# Patient Record
Sex: MALE | Race: WHITE | NOT HISPANIC OR LATINO | Employment: FULL TIME | URBAN - METROPOLITAN AREA
[De-identification: names, ages, dates, MRNs, and addresses within clinical notes are randomized per-mention and may not be internally consistent; named-entity substitution may affect disease eponyms.]

---

## 2023-02-09 PROBLEM — J34.2 NASAL SEPTAL DEVIATION: Status: ACTIVE | Noted: 2023-02-09

## 2023-02-09 PROBLEM — E78.5 DYSLIPIDEMIA, GOAL LDL BELOW 100: Status: ACTIVE | Noted: 2023-02-09

## 2023-02-09 PROBLEM — E66.811 CLASS 1 OBESITY WITH BODY MASS INDEX (BMI) OF 33.0 TO 33.9 IN ADULT: Status: ACTIVE | Noted: 2023-02-09

## 2023-02-09 PROBLEM — K64.9 HEMORRHOIDS: Status: ACTIVE | Noted: 2023-02-09

## 2023-02-09 PROBLEM — E66.9 CLASS 1 OBESITY WITH BODY MASS INDEX (BMI) OF 33.0 TO 33.9 IN ADULT: Status: ACTIVE | Noted: 2023-02-09

## 2023-02-09 PROBLEM — K62.5 PAINLESS RECTAL BLEEDING: Status: ACTIVE | Noted: 2023-02-09

## 2023-02-09 PROBLEM — K21.9 GASTROESOPHAGEAL REFLUX DISEASE WITHOUT ESOPHAGITIS: Status: ACTIVE | Noted: 2023-02-09

## 2023-02-09 PROBLEM — E88.810 METABOLIC SYNDROME X: Status: ACTIVE | Noted: 2023-02-09

## 2023-02-09 PROBLEM — E66.811 CLASS 1 OBESITY DUE TO EXCESS CALORIES WITH SERIOUS COMORBIDITY AND BODY MASS INDEX (BMI) OF 32.0 TO 32.9 IN ADULT: Status: ACTIVE | Noted: 2023-02-09

## 2023-02-09 PROBLEM — Z86.0100 PERSONAL HISTORY OF COLONIC POLYPS: Status: ACTIVE | Noted: 2023-02-09

## 2023-02-09 PROBLEM — I49.3 PVCS (PREMATURE VENTRICULAR CONTRACTIONS): Status: ACTIVE | Noted: 2023-02-09

## 2023-02-09 PROBLEM — K57.90 DIVERTICULOSIS: Status: ACTIVE | Noted: 2023-02-09

## 2023-02-09 PROBLEM — E55.9 HYPOVITAMINOSIS D: Status: ACTIVE | Noted: 2023-02-09

## 2023-02-09 PROBLEM — G47.33 OBSTRUCTIVE SLEEP APNEA: Status: ACTIVE | Noted: 2023-02-09

## 2023-02-09 PROBLEM — Z86.010 PERSONAL HISTORY OF COLONIC POLYPS: Status: ACTIVE | Noted: 2023-02-09

## 2023-02-09 PROBLEM — I47.29 NSVT (NONSUSTAINED VENTRICULAR TACHYCARDIA) (MULTI): Status: ACTIVE | Noted: 2023-02-09

## 2023-02-09 PROBLEM — E66.09 CLASS 1 OBESITY DUE TO EXCESS CALORIES WITH SERIOUS COMORBIDITY AND BODY MASS INDEX (BMI) OF 32.0 TO 32.9 IN ADULT: Status: ACTIVE | Noted: 2023-02-09

## 2023-02-09 RX ORDER — HYDROCORTISONE 25 MG/G
CREAM TOPICAL
COMMUNITY

## 2023-02-09 RX ORDER — ROSUVASTATIN CALCIUM 10 MG/1
1 TABLET, COATED ORAL
COMMUNITY
Start: 2022-09-22 | End: 2023-03-15

## 2023-02-09 RX ORDER — DILTIAZEM HYDROCHLORIDE 120 MG/1
1 CAPSULE, EXTENDED RELEASE ORAL DAILY
COMMUNITY
Start: 2021-09-15 | End: 2023-12-01 | Stop reason: SDUPTHER

## 2023-02-09 RX ORDER — MULTIVITAMIN
TABLET ORAL
COMMUNITY

## 2023-02-09 RX ORDER — FLUTICASONE PROPIONATE 50 MCG
2 SPRAY, SUSPENSION (ML) NASAL DAILY
COMMUNITY
Start: 2021-08-31 | End: 2024-03-26 | Stop reason: SDUPTHER

## 2023-02-09 RX ORDER — FAMOTIDINE 10 MG/1
1 TABLET ORAL DAILY
COMMUNITY

## 2023-03-15 DIAGNOSIS — E78.5 HYPERLIPIDEMIA, UNSPECIFIED: ICD-10-CM

## 2023-03-15 RX ORDER — ROSUVASTATIN CALCIUM 10 MG/1
TABLET, COATED ORAL
Qty: 90 TABLET | Refills: 1 | Status: SHIPPED | OUTPATIENT
Start: 2023-03-15 | End: 2023-09-14

## 2023-03-23 ENCOUNTER — OFFICE VISIT (OUTPATIENT)
Dept: PRIMARY CARE | Facility: CLINIC | Age: 48
End: 2023-03-23
Payer: COMMERCIAL

## 2023-03-23 VITALS
SYSTOLIC BLOOD PRESSURE: 130 MMHG | BODY MASS INDEX: 33.05 KG/M2 | HEIGHT: 72 IN | HEART RATE: 68 BPM | WEIGHT: 244 LBS | DIASTOLIC BLOOD PRESSURE: 98 MMHG | RESPIRATION RATE: 16 BRPM

## 2023-03-23 DIAGNOSIS — M72.2 PLANTAR FASCIITIS, LEFT: ICD-10-CM

## 2023-03-23 DIAGNOSIS — I10 PRIMARY HYPERTENSION: ICD-10-CM

## 2023-03-23 DIAGNOSIS — Z86.010 PERSONAL HISTORY OF COLONIC POLYPS: ICD-10-CM

## 2023-03-23 DIAGNOSIS — G47.33 OBSTRUCTIVE SLEEP APNEA: Primary | ICD-10-CM

## 2023-03-23 DIAGNOSIS — J34.2 NASAL SEPTAL DEVIATION: ICD-10-CM

## 2023-03-23 DIAGNOSIS — I47.29 NSVT (NONSUSTAINED VENTRICULAR TACHYCARDIA) (MULTI): ICD-10-CM

## 2023-03-23 DIAGNOSIS — E66.09 CLASS 1 OBESITY DUE TO EXCESS CALORIES WITH SERIOUS COMORBIDITY AND BODY MASS INDEX (BMI) OF 33.0 TO 33.9 IN ADULT: ICD-10-CM

## 2023-03-23 DIAGNOSIS — E78.5 DYSLIPIDEMIA, GOAL LDL BELOW 100: ICD-10-CM

## 2023-03-23 PROBLEM — K62.5 RECTAL BLEEDING: Status: ACTIVE | Noted: 2023-03-23

## 2023-03-23 PROBLEM — K62.5 PAINLESS RECTAL BLEEDING: Status: RESOLVED | Noted: 2023-02-09 | Resolved: 2023-03-23

## 2023-03-23 PROBLEM — E66.811 CLASS 1 OBESITY WITH BODY MASS INDEX (BMI) OF 33.0 TO 33.9 IN ADULT: Status: RESOLVED | Noted: 2023-02-09 | Resolved: 2023-03-23

## 2023-03-23 PROBLEM — E66.811 CLASS 1 OBESITY DUE TO EXCESS CALORIES WITH SERIOUS COMORBIDITY AND BODY MASS INDEX (BMI) OF 32.0 TO 32.9 IN ADULT: Status: RESOLVED | Noted: 2023-02-09 | Resolved: 2023-03-23

## 2023-03-23 PROBLEM — K62.5 RECTAL BLEEDING: Status: RESOLVED | Noted: 2023-03-23 | Resolved: 2023-03-23

## 2023-03-23 PROBLEM — E66.9 CLASS 1 OBESITY WITH BODY MASS INDEX (BMI) OF 33.0 TO 33.9 IN ADULT: Status: RESOLVED | Noted: 2023-02-09 | Resolved: 2023-03-23

## 2023-03-23 PROCEDURE — G0447 BEHAVIOR COUNSEL OBESITY 15M: HCPCS | Performed by: INTERNAL MEDICINE

## 2023-03-23 PROCEDURE — 1036F TOBACCO NON-USER: CPT | Performed by: INTERNAL MEDICINE

## 2023-03-23 PROCEDURE — 3075F SYST BP GE 130 - 139MM HG: CPT | Performed by: INTERNAL MEDICINE

## 2023-03-23 PROCEDURE — 99215 OFFICE O/P EST HI 40 MIN: CPT | Performed by: INTERNAL MEDICINE

## 2023-03-23 PROCEDURE — 3008F BODY MASS INDEX DOCD: CPT | Performed by: INTERNAL MEDICINE

## 2023-03-23 PROCEDURE — G0444 DEPRESSION SCREEN ANNUAL: HCPCS | Performed by: INTERNAL MEDICINE

## 2023-03-23 PROCEDURE — 3080F DIAST BP >= 90 MM HG: CPT | Performed by: INTERNAL MEDICINE

## 2023-03-23 ASSESSMENT — COLUMBIA-SUICIDE SEVERITY RATING SCALE - C-SSRS
1. IN THE PAST MONTH, HAVE YOU WISHED YOU WERE DEAD OR WISHED YOU COULD GO TO SLEEP AND NOT WAKE UP?: NO
6. HAVE YOU EVER DONE ANYTHING, STARTED TO DO ANYTHING, OR PREPARED TO DO ANYTHING TO END YOUR LIFE?: NO
2. HAVE YOU ACTUALLY HAD ANY THOUGHTS OF KILLING YOURSELF?: NO

## 2023-03-23 ASSESSMENT — ANXIETY QUESTIONNAIRES
6. BECOMING EASILY ANNOYED OR IRRITABLE: NOT AT ALL
7. FEELING AFRAID AS IF SOMETHING AWFUL MIGHT HAPPEN: NOT AT ALL
3. WORRYING TOO MUCH ABOUT DIFFERENT THINGS: NOT AT ALL
1. FEELING NERVOUS, ANXIOUS, OR ON EDGE: NOT AT ALL
2. NOT BEING ABLE TO STOP OR CONTROL WORRYING: NOT AT ALL
4. TROUBLE RELAXING: NOT AT ALL
IF YOU CHECKED OFF ANY PROBLEMS ON THIS QUESTIONNAIRE, HOW DIFFICULT HAVE THESE PROBLEMS MADE IT FOR YOU TO DO YOUR WORK, TAKE CARE OF THINGS AT HOME, OR GET ALONG WITH OTHER PEOPLE: NOT DIFFICULT AT ALL
GAD7 TOTAL SCORE: 0
5. BEING SO RESTLESS THAT IT IS HARD TO SIT STILL: NOT AT ALL

## 2023-03-23 ASSESSMENT — ENCOUNTER SYMPTOMS
LOSS OF SENSATION IN FEET: 0
DEPRESSION: 0
OCCASIONAL FEELINGS OF UNSTEADINESS: 0

## 2023-03-23 ASSESSMENT — PATIENT HEALTH QUESTIONNAIRE - PHQ9
SUM OF ALL RESPONSES TO PHQ9 QUESTIONS 1 AND 2: 0
1. LITTLE INTEREST OR PLEASURE IN DOING THINGS: NOT AT ALL
2. FEELING DOWN, DEPRESSED OR HOPELESS: NOT AT ALL

## 2023-03-23 NOTE — ASSESSMENT & PLAN NOTE
Reviewed sleep study completed on March 29, 2022 patient was noted to have severe obstructive sleep apnea with a ALEC index of 33 events per hour and sleep-related hypoxia with a uday of 79% recommended empiric initiation of AutoPap at the settings of 5 to 18 cm of water with continued monitoring along with avoiding sedatives and alcohol at night patient works as a  we will institute therapy with aero care and reevaluate in 3 months

## 2023-03-23 NOTE — ASSESSMENT & PLAN NOTE
Recent colonoscopy completed on December 30, 2022 patient had one 6 mm polyp in the transverse colon and a 45 mm polyp in the sigmoid colon removed diverticular disease of the left colon and nonbleeding internal hemorrhoids lesions were consistent with tubular adenomas recommended follow-up in 3 years for surveillance

## 2023-03-23 NOTE — ASSESSMENT & PLAN NOTE
Patient is focused on intensive diet and exercise changes reduction and fast food and processed food focus on fruits and vegetables and Mediterranean diet reevaluate in 3 months counseling given

## 2023-03-23 NOTE — PROGRESS NOTES
Subjective   Patient ID: Bubba Garza is a 47 y.o. male who presents for Follow-up and Foot Pain.    HPI     Review of Systems    Objective   BP (!) 130/98 (BP Location: Left arm, Patient Position: Sitting)   Pulse 68   Resp 16   Ht 1.829 m (6')   Wt 111 kg (244 lb)   BMI 33.09 kg/m²     Physical Exam    Assessment/Plan

## 2023-03-23 NOTE — PROGRESS NOTES
Subjective   Reason for Visit: Bubba Garza is an 47 y.o. male here for a  Wellness visit.     Past Medical, Surgical, and Family History reviewed and updated in chart.    Reviewed all medications by prescribing practitioner or clinical pharmacist (such as prescriptions, OTCs, herbal therapies and supplements) and documented in the medical record.    Patient with left heel pain after mild injury while driving.  Examination reveals Planter fasciitis.  Mild indentation on the head reveals no evidence of any rash or skin cancer.  Rectal bleeding treated with suppositories for hemorrhoids history of diverticular disease and 2 polyps removed in December by colonoscopy 3-year surveillance patient has nasal deviated septum as severe sleep apnea and would like to see ENT for possible repair.  Increase in weight related to indiscretion with diet and regular exercise        Patient Care Team:  Andrey Antonio DO as PCP - General  ISHMAEL Hoover-CNP as PCP - [facility] PCP     Review of Systems   All other systems reviewed and are negative.      Objective   Vitals:  BP (!) 130/98 (BP Location: Left arm, Patient Position: Sitting)   Pulse 68   Resp 16   Ht 1.829 m (6')   Wt 111 kg (244 lb)   BMI 33.09 kg/m²       Physical Exam  Cardiovascular:      Pulses:           Dorsalis pedis pulses are 3+ on the right side and 3+ on the left side.   Feet:      Right foot:      Skin integrity: Skin integrity normal.      Left foot:      Skin integrity: Skin integrity normal.      Comments: Discomfort at insertion of plantar fascia left foot consistent with plantar fasciitis        Assessment/Plan   Problem List Items Addressed This Visit          Nervous    Obstructive sleep apnea - Primary    Current Assessment & Plan     Reviewed sleep study completed on March 29, 2022 patient was noted to have severe obstructive sleep apnea with a ALEC index of 33 events per hour and sleep-related hypoxia with a uday of 79% recommended  empiric initiation of AutoPap at the settings of 5 to 18 cm of water with continued monitoring along with avoiding sedatives and alcohol at night patient works as a  we will institute therapy with aero care and reevaluate in 3 months            Circulatory    NSVT (nonsustained ventricular tachycardia)    Current Assessment & Plan     Stable on diltiazem 120 mg 1 tablet daily treatment of severe sleep apnea should also improve palpitations         Primary hypertension    Overview     Start lisinopril hydrochlorothiazide 20/12.51 tablet daily reevaluate in 3 months with blood work         Relevant Orders    Hepatitis C antibody    Comprehensive Metabolic Panel    Albumin , Urine Random       Digestive    Personal history of colonic polyps    Current Assessment & Plan     Recent colonoscopy completed on December 30, 2022 patient had one 6 mm polyp in the transverse colon and a 45 mm polyp in the sigmoid colon removed diverticular disease of the left colon and nonbleeding internal hemorrhoids lesions were consistent with tubular adenomas recommended follow-up in 3 years for surveillance            Musculoskeletal    Plantar fasciitis, left    Current Assessment & Plan     Given education and heel cord exercises to complete            Endocrine/Metabolic    Class 1 obesity due to excess calories with serious comorbidity and body mass index (BMI) of 33.0 to 33.9 in adult    Current Assessment & Plan     Patient is focused on intensive diet and exercise changes reduction and fast food and processed food focus on fruits and vegetables and Mediterranean diet reevaluate in 3 months counseling given            Other    Dyslipidemia, goal LDL below 100    Current Assessment & Plan     LDL cholesterol 84 improved on rosuvastatin 10 mg daily elevated triglycerides we will give education on high glycemic index foods and reduction with diet with triglycerides moderate alcohol intake         Nasal septal deviation     Current Assessment & Plan     Referral to ENT

## 2023-03-23 NOTE — ASSESSMENT & PLAN NOTE
Stable on diltiazem 120 mg 1 tablet daily treatment of severe sleep apnea should also improve palpitations

## 2023-03-27 ENCOUNTER — TELEPHONE (OUTPATIENT)
Dept: PRIMARY CARE | Facility: CLINIC | Age: 48
End: 2023-03-27
Payer: COMMERCIAL

## 2023-03-27 DIAGNOSIS — I10 PRIMARY HYPERTENSION: Primary | ICD-10-CM

## 2023-03-27 RX ORDER — LISINOPRIL AND HYDROCHLOROTHIAZIDE 12.5; 2 MG/1; MG/1
1 TABLET ORAL DAILY
Qty: 30 TABLET | Refills: 11 | Status: SHIPPED | OUTPATIENT
Start: 2023-03-27 | End: 2023-04-20

## 2023-03-27 NOTE — TELEPHONE ENCOUNTER
Pt was in last week, pt thought that you were going to add a  new prescription for his BP. He called the pharmacy and they do not have anything. Can you please advise if you were going to add a new prescription. Thank you

## 2023-04-20 DIAGNOSIS — I10 PRIMARY HYPERTENSION: ICD-10-CM

## 2023-04-20 RX ORDER — LISINOPRIL AND HYDROCHLOROTHIAZIDE 12.5; 2 MG/1; MG/1
1 TABLET ORAL DAILY
Qty: 30 TABLET | Refills: 11 | Status: SHIPPED | OUTPATIENT
Start: 2023-04-20 | End: 2023-04-28 | Stop reason: SINTOL

## 2023-04-27 ENCOUNTER — TELEPHONE (OUTPATIENT)
Dept: PRIMARY CARE | Facility: CLINIC | Age: 48
End: 2023-04-27
Payer: COMMERCIAL

## 2023-04-27 DIAGNOSIS — I10 PRIMARY HYPERTENSION: Primary | ICD-10-CM

## 2023-04-27 NOTE — TELEPHONE ENCOUNTER
Patient states that since starting the blood pressure medication lisinopril-hydrochlorothiazide he has had a cough asking for something else to be called in. Patient states that it is hard for him to do his job cause he is always coughing.

## 2023-04-28 RX ORDER — LOSARTAN POTASSIUM AND HYDROCHLOROTHIAZIDE 12.5; 1 MG/1; MG/1
1 TABLET ORAL DAILY
Qty: 30 TABLET | Refills: 11 | Status: SHIPPED | OUTPATIENT
Start: 2023-04-28 | End: 2023-05-30

## 2023-05-28 DIAGNOSIS — I10 PRIMARY HYPERTENSION: ICD-10-CM

## 2023-05-30 RX ORDER — LOSARTAN POTASSIUM AND HYDROCHLOROTHIAZIDE 12.5; 1 MG/1; MG/1
TABLET ORAL
Qty: 90 TABLET | Refills: 3 | Status: SHIPPED | OUTPATIENT
Start: 2023-05-30 | End: 2023-06-26 | Stop reason: ALTCHOICE

## 2023-06-13 ENCOUNTER — LAB (OUTPATIENT)
Dept: LAB | Facility: LAB | Age: 48
End: 2023-06-13
Payer: COMMERCIAL

## 2023-06-13 DIAGNOSIS — I10 PRIMARY HYPERTENSION: ICD-10-CM

## 2023-06-13 LAB
ALANINE AMINOTRANSFERASE (SGPT) (U/L) IN SER/PLAS: 32 U/L (ref 10–52)
ALBUMIN (G/DL) IN SER/PLAS: 4.4 G/DL (ref 3.4–5)
ALBUMIN (MG/L) IN URINE: 8.3 MG/L
ALBUMIN/CREATININE (UG/MG) IN URINE: 3.8 UG/MG CRT (ref 0–30)
ALKALINE PHOSPHATASE (U/L) IN SER/PLAS: 65 U/L (ref 33–120)
ANION GAP IN SER/PLAS: 9 MMOL/L (ref 10–20)
ASPARTATE AMINOTRANSFERASE (SGOT) (U/L) IN SER/PLAS: 20 U/L (ref 9–39)
BILIRUBIN TOTAL (MG/DL) IN SER/PLAS: 0.6 MG/DL (ref 0–1.2)
CALCIUM (MG/DL) IN SER/PLAS: 9.5 MG/DL (ref 8.6–10.3)
CARBON DIOXIDE, TOTAL (MMOL/L) IN SER/PLAS: 28 MMOL/L (ref 21–32)
CHLORIDE (MMOL/L) IN SER/PLAS: 106 MMOL/L (ref 98–107)
CREATININE (MG/DL) IN SER/PLAS: 1.14 MG/DL (ref 0.5–1.3)
CREATININE (MG/DL) IN URINE: 218 MG/DL (ref 20–370)
GFR MALE: 78 ML/MIN/1.73M2
GLUCOSE (MG/DL) IN SER/PLAS: 86 MG/DL (ref 74–99)
HEPATITIS C VIRUS AB PRESENCE IN SERUM: NONREACTIVE
POTASSIUM (MMOL/L) IN SER/PLAS: 4.2 MMOL/L (ref 3.5–5.3)
PROTEIN TOTAL: 7.2 G/DL (ref 6.4–8.2)
SODIUM (MMOL/L) IN SER/PLAS: 139 MMOL/L (ref 136–145)
UREA NITROGEN (MG/DL) IN SER/PLAS: 14 MG/DL (ref 6–23)

## 2023-06-13 PROCEDURE — 82570 ASSAY OF URINE CREATININE: CPT

## 2023-06-13 PROCEDURE — 80053 COMPREHEN METABOLIC PANEL: CPT

## 2023-06-13 PROCEDURE — 82043 UR ALBUMIN QUANTITATIVE: CPT

## 2023-06-13 PROCEDURE — 86803 HEPATITIS C AB TEST: CPT

## 2023-06-13 PROCEDURE — 36415 COLL VENOUS BLD VENIPUNCTURE: CPT

## 2023-06-26 ENCOUNTER — OFFICE VISIT (OUTPATIENT)
Dept: PRIMARY CARE | Facility: CLINIC | Age: 48
End: 2023-06-26
Payer: COMMERCIAL

## 2023-06-26 VITALS
BODY MASS INDEX: 32.78 KG/M2 | WEIGHT: 242 LBS | HEART RATE: 74 BPM | DIASTOLIC BLOOD PRESSURE: 84 MMHG | HEIGHT: 72 IN | SYSTOLIC BLOOD PRESSURE: 132 MMHG

## 2023-06-26 DIAGNOSIS — I49.3 PVCS (PREMATURE VENTRICULAR CONTRACTIONS): ICD-10-CM

## 2023-06-26 DIAGNOSIS — T46.4X5A ACE-INHIBITOR COUGH: ICD-10-CM

## 2023-06-26 DIAGNOSIS — E66.09 CLASS 1 OBESITY DUE TO EXCESS CALORIES WITH SERIOUS COMORBIDITY AND BODY MASS INDEX (BMI) OF 32.0 TO 32.9 IN ADULT: Primary | ICD-10-CM

## 2023-06-26 DIAGNOSIS — R05.8 ACE-INHIBITOR COUGH: ICD-10-CM

## 2023-06-26 DIAGNOSIS — Z86.010 PERSONAL HISTORY OF COLONIC POLYPS: ICD-10-CM

## 2023-06-26 DIAGNOSIS — E55.9 HYPOVITAMINOSIS D: ICD-10-CM

## 2023-06-26 DIAGNOSIS — G47.33 OBSTRUCTIVE SLEEP APNEA: ICD-10-CM

## 2023-06-26 DIAGNOSIS — I10 PRIMARY HYPERTENSION: ICD-10-CM

## 2023-06-26 DIAGNOSIS — I47.29 NSVT (NONSUSTAINED VENTRICULAR TACHYCARDIA) (MULTI): ICD-10-CM

## 2023-06-26 DIAGNOSIS — K21.9 GASTROESOPHAGEAL REFLUX DISEASE WITHOUT ESOPHAGITIS: ICD-10-CM

## 2023-06-26 PROCEDURE — 90471 IMMUNIZATION ADMIN: CPT | Performed by: INTERNAL MEDICINE

## 2023-06-26 PROCEDURE — 1036F TOBACCO NON-USER: CPT | Performed by: INTERNAL MEDICINE

## 2023-06-26 PROCEDURE — 3075F SYST BP GE 130 - 139MM HG: CPT | Performed by: INTERNAL MEDICINE

## 2023-06-26 PROCEDURE — G0447 BEHAVIOR COUNSEL OBESITY 15M: HCPCS | Performed by: INTERNAL MEDICINE

## 2023-06-26 PROCEDURE — 90750 HZV VACC RECOMBINANT IM: CPT | Performed by: INTERNAL MEDICINE

## 2023-06-26 PROCEDURE — 3079F DIAST BP 80-89 MM HG: CPT | Performed by: INTERNAL MEDICINE

## 2023-06-26 PROCEDURE — 3008F BODY MASS INDEX DOCD: CPT | Performed by: INTERNAL MEDICINE

## 2023-06-26 PROCEDURE — 99213 OFFICE O/P EST LOW 20 MIN: CPT | Performed by: INTERNAL MEDICINE

## 2023-06-26 RX ORDER — LOSARTAN POTASSIUM AND HYDROCHLOROTHIAZIDE 25; 100 MG/1; MG/1
1 TABLET ORAL DAILY
Qty: 90 TABLET | Refills: 3 | Status: SHIPPED | OUTPATIENT
Start: 2023-06-26 | End: 2024-06-03

## 2023-06-26 ASSESSMENT — ANXIETY QUESTIONNAIRES
2. NOT BEING ABLE TO STOP OR CONTROL WORRYING: NOT AT ALL
3. WORRYING TOO MUCH ABOUT DIFFERENT THINGS: NOT AT ALL
7. FEELING AFRAID AS IF SOMETHING AWFUL MIGHT HAPPEN: NOT AT ALL
5. BEING SO RESTLESS THAT IT IS HARD TO SIT STILL: NOT AT ALL
4. TROUBLE RELAXING: NOT AT ALL
GAD7 TOTAL SCORE: 0
IF YOU CHECKED OFF ANY PROBLEMS ON THIS QUESTIONNAIRE, HOW DIFFICULT HAVE THESE PROBLEMS MADE IT FOR YOU TO DO YOUR WORK, TAKE CARE OF THINGS AT HOME, OR GET ALONG WITH OTHER PEOPLE: NOT DIFFICULT AT ALL
6. BECOMING EASILY ANNOYED OR IRRITABLE: NOT AT ALL
1. FEELING NERVOUS, ANXIOUS, OR ON EDGE: NOT AT ALL

## 2023-06-26 ASSESSMENT — PATIENT HEALTH QUESTIONNAIRE - PHQ9
SUM OF ALL RESPONSES TO PHQ9 QUESTIONS 1 AND 2: 0
2. FEELING DOWN, DEPRESSED OR HOPELESS: NOT AT ALL
1. LITTLE INTEREST OR PLEASURE IN DOING THINGS: NOT AT ALL

## 2023-06-26 ASSESSMENT — ENCOUNTER SYMPTOMS
HYPERTENSION: 1
LOSS OF SENSATION IN FEET: 0
OCCASIONAL FEELINGS OF UNSTEADINESS: 0
DEPRESSION: 0

## 2023-06-26 NOTE — PROGRESS NOTES
Subjective   Patient ID: Bubba Garza is a 48 y.o. male who presents for Follow-up and Hypertension.    HPI     Review of Systems    Objective   /84 (BP Location: Left arm, Patient Position: Sitting)   Pulse 74   Ht 1.829 m (6')   Wt 110 kg (242 lb)   BMI 32.82 kg/m²     Physical Exam    Assessment/Plan

## 2023-06-26 NOTE — PROGRESS NOTES
I spent greater than 15 minutes face-to-face with individual providing recommendations for nutrition choices and exercise plan to help achieve weight reductionSubjective   Reason for Visit: Bubba Garza is an 48 y.o. male here for a fu  visit.     Past Medical, Surgical, and Family History reviewed and updated in chart.         Hypertension        Patient Care Team:  Andrey Antonio DO as PCP - General  ISHMAEL Hoover-CNP as PCP - [facility] P[facility]     Review of Systems   All other systems reviewed and are negative.      Objective   Vitals:  /84 (BP Location: Left arm, Patient Position: Sitting)   Pulse 74   Ht 1.829 m (6')   Wt 110 kg (242 lb)   BMI 32.82 kg/m²       Physical Exam  Vitals and nursing note reviewed.   Constitutional:       General: He is not in acute distress.     Appearance: Normal appearance. He is well-developed. He is obese. He is not toxic-appearing.   HENT:      Head: Normocephalic and atraumatic.      Right Ear: Tympanic membrane and external ear normal.      Left Ear: Tympanic membrane and external ear normal.      Nose: Nose normal.      Mouth/Throat:      Mouth: Mucous membranes are moist.      Pharynx: Oropharynx is clear. No oropharyngeal exudate or posterior oropharyngeal erythema.      Tonsils: No tonsillar exudate. 2+ on the right. 2+ on the left.   Eyes:      Extraocular Movements: Extraocular movements intact.      Conjunctiva/sclera: Conjunctivae normal.   Cardiovascular:      Rate and Rhythm: Normal rate and regular rhythm.      Pulses: Normal pulses.      Heart sounds: Normal heart sounds. No murmur heard.  Pulmonary:      Effort: Pulmonary effort is normal.      Breath sounds: Normal breath sounds.   Abdominal:      General: Abdomen is flat. Bowel sounds are normal.      Palpations: Abdomen is soft.   Musculoskeletal:      Cervical back: Neck supple.   Feet:      Right foot:      Skin integrity: Skin integrity normal. No ulcer, blister, skin breakdown, erythema,  warmth or callus.      Toenail Condition: Right toenails are normal.      Left foot:      Skin integrity: Skin integrity normal. No ulcer, blister, skin breakdown, erythema, warmth or callus.      Toenail Condition: Left toenails are normal.   Lymphadenopathy:      Cervical: No cervical adenopathy.   Skin:     General: Skin is warm and dry.      Capillary Refill: Capillary refill takes more than 3 seconds.      Findings: No rash.   Neurological:      Mental Status: He is alert. Mental status is at baseline.      Sensory: Sensation is intact.   Psychiatric:         Mood and Affect: Mood normal.         Behavior: Behavior normal.         Thought Content: Thought content normal.         Judgment: Judgment normal.         Assessment/Plan   Problem List Items Addressed This Visit       Gastroesophageal reflux disease without esophagitis    Current Assessment & Plan     Symptoms controlled on famotidine low-dose         Hypovitaminosis D    Current Assessment & Plan     Repeat vitamin D levels on medical therapy         Relevant Orders    Comprehensive Metabolic Panel    Hemoglobin A1C    Lipid Panel    Albumin , Urine Random    [facility] - PCP    Vitamin D 25-Hydroxy,Total    NSVT (nonsustained ventricular tachycardia) (CMS/HCC)    Current Assessment & Plan     Well-controlled on diltiazem Extended release 120 mg 1 tablet daily patient denies palpitations or syncopal events         Obstructive sleep apnea    Current Assessment & Plan     Reevaluate with home sleep study.  Patient with history of obstructive nasal symptoms due to deviated septum         Relevant Orders    Comprehensive Metabolic Panel    Hemoglobin A1C    Lipid Panel    Albumin , Urine Random    [facility] - PCP    PVCs (premature ventricular contractions)    Current Assessment & Plan     Stable continue diltiazem CD1 120 mg daily         Relevant Orders    Comprehensive Metabolic Panel    Hemoglobin A1C     Lipid Panel    Albumin , Urine Random    [facility] - PCP    Personal history of colonic polyps    Current Assessment & Plan     Colonoscopy completed December 30, 2022 with colon polyps.  Continue with 3-year surveillance recommended by gastroenterology         Class 1 obesity due to excess calories with serious comorbidity and body mass index (BMI) of 32.0 to 32.9 in adult - Primary    Current Assessment & Plan     Continue to work at strategies to reduce weight and hypertension through-diet or Mediterranean diet and improving exercise capacity by regular activity 150 minutes of moderate activity for weight maintenance and improvement in diet         Relevant Orders    Comprehensive Metabolic Panel    Hemoglobin A1C    Lipid Panel    Albumin , Urine Random    Follow [facility] - PCP    Primary hypertension    Overview     Start lisinopril hydrochlorothiazide 20/12.51 tablet daily reevaluate in 3 months with blood work         Current Assessment & Plan     Overall improvement seen with initiation of lisinopril hydrochlorothiazide 20/12.5.  However patient developed ACE inhibitor cough.  Patient switched to losartan HCTZ we will increase losartan HCTZ to 100/25 1 tablet daily reevaluate with next visit and blood work         Relevant Medications    losartan-hydrochlorothiazide (Hyzaar) 100-25 mg tablet    Other Relevant Orders    Comprehensive Metabolic Panel    Hemoglobin A1C    Lipid Panel    Albumin , Urine Random    Follow Up [facility] - PCP    ACE-inhibitor cough

## 2023-07-02 PROBLEM — R05.8 ACE-INHIBITOR COUGH: Status: ACTIVE | Noted: 2023-07-02

## 2023-07-02 PROBLEM — T46.4X5A ACE-INHIBITOR COUGH: Status: ACTIVE | Noted: 2023-07-02

## 2023-07-02 NOTE — ASSESSMENT & PLAN NOTE
Reevaluate with home sleep study.  Patient with history of obstructive nasal symptoms due to deviated septum

## 2023-07-02 NOTE — ASSESSMENT & PLAN NOTE
Well-controlled on diltiazem Extended release 120 mg 1 tablet daily patient denies palpitations or syncopal events

## 2023-07-02 NOTE — ASSESSMENT & PLAN NOTE
Colonoscopy completed December 30, 2022 with colon polyps.  Continue with 3-year surveillance recommended by gastroenterology

## 2023-07-02 NOTE — ASSESSMENT & PLAN NOTE
Overall improvement seen with initiation of lisinopril hydrochlorothiazide 20/12.5.  However patient developed ACE inhibitor cough.  Patient switched to losartan HCTZ we will increase losartan HCTZ to 100/25 1 tablet daily reevaluate with next visit and blood work

## 2023-07-02 NOTE — ASSESSMENT & PLAN NOTE
Continue to work at strategies to reduce weight and hypertension through-diet or Mediterranean diet and improving exercise capacity by regular activity 150 minutes of moderate activity for weight maintenance and improvement in diet

## 2023-09-14 DIAGNOSIS — E78.5 HYPERLIPIDEMIA, UNSPECIFIED: ICD-10-CM

## 2023-09-14 RX ORDER — ROSUVASTATIN CALCIUM 10 MG/1
TABLET, COATED ORAL
Qty: 90 TABLET | Refills: 1 | Status: SHIPPED | OUTPATIENT
Start: 2023-09-14 | End: 2024-03-11

## 2023-09-18 ENCOUNTER — LAB (OUTPATIENT)
Dept: LAB | Facility: LAB | Age: 48
End: 2023-09-18
Payer: COMMERCIAL

## 2023-09-18 DIAGNOSIS — G47.33 OBSTRUCTIVE SLEEP APNEA: ICD-10-CM

## 2023-09-18 DIAGNOSIS — E55.9 HYPOVITAMINOSIS D: ICD-10-CM

## 2023-09-18 DIAGNOSIS — I10 PRIMARY HYPERTENSION: ICD-10-CM

## 2023-09-18 DIAGNOSIS — I49.3 PVCS (PREMATURE VENTRICULAR CONTRACTIONS): ICD-10-CM

## 2023-09-18 DIAGNOSIS — E66.09 CLASS 1 OBESITY DUE TO EXCESS CALORIES WITH SERIOUS COMORBIDITY AND BODY MASS INDEX (BMI) OF 32.0 TO 32.9 IN ADULT: ICD-10-CM

## 2023-09-18 LAB
ALANINE AMINOTRANSFERASE (SGPT) (U/L) IN SER/PLAS: 26 U/L (ref 10–52)
ALBUMIN (G/DL) IN SER/PLAS: 4.1 G/DL (ref 3.4–5)
ALBUMIN (MG/L) IN URINE: <7 MG/L
ALBUMIN/CREATININE (UG/MG) IN URINE: NORMAL UG/MG CRT (ref 0–30)
ALKALINE PHOSPHATASE (U/L) IN SER/PLAS: 56 U/L (ref 33–120)
ANION GAP IN SER/PLAS: 10 MMOL/L (ref 10–20)
ASPARTATE AMINOTRANSFERASE (SGOT) (U/L) IN SER/PLAS: 16 U/L (ref 9–39)
BILIRUBIN TOTAL (MG/DL) IN SER/PLAS: 0.6 MG/DL (ref 0–1.2)
CALCIDIOL (25 OH VITAMIN D3) (NG/ML) IN SER/PLAS: 26 NG/ML
CALCIUM (MG/DL) IN SER/PLAS: 9 MG/DL (ref 8.6–10.3)
CARBON DIOXIDE, TOTAL (MMOL/L) IN SER/PLAS: 25 MMOL/L (ref 21–32)
CHLORIDE (MMOL/L) IN SER/PLAS: 109 MMOL/L (ref 98–107)
CHOLESTEROL (MG/DL) IN SER/PLAS: 167 MG/DL (ref 0–199)
CHOLESTEROL IN HDL (MG/DL) IN SER/PLAS: 28.3 MG/DL
CHOLESTEROL/HDL RATIO: 5.9
CREATININE (MG/DL) IN SER/PLAS: 1.11 MG/DL (ref 0.5–1.3)
CREATININE (MG/DL) IN URINE: 177 MG/DL (ref 20–370)
ESTIMATED AVERAGE GLUCOSE FOR HBA1C: 111 MG/DL
GFR MALE: 81 ML/MIN/1.73M2
GLUCOSE (MG/DL) IN SER/PLAS: 94 MG/DL (ref 74–99)
HEMOGLOBIN A1C/HEMOGLOBIN TOTAL IN BLOOD: 5.5 %
LDL: 77 MG/DL (ref 0–99)
NON HDL CHOLESTEROL: 139 MG/DL
POTASSIUM (MMOL/L) IN SER/PLAS: 4.2 MMOL/L (ref 3.5–5.3)
PROTEIN TOTAL: 6.9 G/DL (ref 6.4–8.2)
SODIUM (MMOL/L) IN SER/PLAS: 140 MMOL/L (ref 136–145)
TRIGLYCERIDE (MG/DL) IN SER/PLAS: 310 MG/DL (ref 0–149)
UREA NITROGEN (MG/DL) IN SER/PLAS: 13 MG/DL (ref 6–23)
VLDL: 62 MG/DL (ref 0–40)

## 2023-09-18 PROCEDURE — 80061 LIPID PANEL: CPT

## 2023-09-18 PROCEDURE — 36415 COLL VENOUS BLD VENIPUNCTURE: CPT

## 2023-09-18 PROCEDURE — 82043 UR ALBUMIN QUANTITATIVE: CPT

## 2023-09-18 PROCEDURE — 83036 HEMOGLOBIN GLYCOSYLATED A1C: CPT

## 2023-09-18 PROCEDURE — 82570 ASSAY OF URINE CREATININE: CPT

## 2023-09-18 PROCEDURE — 82306 VITAMIN D 25 HYDROXY: CPT

## 2023-09-18 PROCEDURE — 80053 COMPREHEN METABOLIC PANEL: CPT

## 2023-09-25 ENCOUNTER — APPOINTMENT (OUTPATIENT)
Dept: PRIMARY CARE | Facility: CLINIC | Age: 48
End: 2023-09-25
Payer: COMMERCIAL

## 2023-09-26 ENCOUNTER — OFFICE VISIT (OUTPATIENT)
Dept: PRIMARY CARE | Facility: CLINIC | Age: 48
End: 2023-09-26
Payer: COMMERCIAL

## 2023-09-26 VITALS
HEART RATE: 78 BPM | DIASTOLIC BLOOD PRESSURE: 80 MMHG | HEIGHT: 72 IN | WEIGHT: 248 LBS | BODY MASS INDEX: 33.59 KG/M2 | SYSTOLIC BLOOD PRESSURE: 130 MMHG

## 2023-09-26 DIAGNOSIS — E55.9 HYPOVITAMINOSIS D: ICD-10-CM

## 2023-09-26 DIAGNOSIS — G47.33 OBSTRUCTIVE SLEEP APNEA: ICD-10-CM

## 2023-09-26 DIAGNOSIS — E66.09 CLASS 1 OBESITY DUE TO EXCESS CALORIES WITH SERIOUS COMORBIDITY AND BODY MASS INDEX (BMI) OF 32.0 TO 32.9 IN ADULT: ICD-10-CM

## 2023-09-26 DIAGNOSIS — Z23 NEED FOR SHINGLES VACCINE: ICD-10-CM

## 2023-09-26 DIAGNOSIS — G47.33 OSA ON CPAP: ICD-10-CM

## 2023-09-26 DIAGNOSIS — I47.29 NSVT (NONSUSTAINED VENTRICULAR TACHYCARDIA) (MULTI): ICD-10-CM

## 2023-09-26 DIAGNOSIS — E66.09 CLASS 1 OBESITY DUE TO EXCESS CALORIES WITH SERIOUS COMORBIDITY AND BODY MASS INDEX (BMI) OF 33.0 TO 33.9 IN ADULT: ICD-10-CM

## 2023-09-26 DIAGNOSIS — I49.3 PVCS (PREMATURE VENTRICULAR CONTRACTIONS): ICD-10-CM

## 2023-09-26 DIAGNOSIS — E78.5 DYSLIPIDEMIA, GOAL LDL BELOW 100: ICD-10-CM

## 2023-09-26 DIAGNOSIS — K21.9 GASTROESOPHAGEAL REFLUX DISEASE WITHOUT ESOPHAGITIS: ICD-10-CM

## 2023-09-26 DIAGNOSIS — I10 PRIMARY HYPERTENSION: Primary | ICD-10-CM

## 2023-09-26 DIAGNOSIS — Z23 FLU VACCINE NEED: ICD-10-CM

## 2023-09-26 PROCEDURE — 3008F BODY MASS INDEX DOCD: CPT | Performed by: INTERNAL MEDICINE

## 2023-09-26 PROCEDURE — 90686 IIV4 VACC NO PRSV 0.5 ML IM: CPT | Performed by: INTERNAL MEDICINE

## 2023-09-26 PROCEDURE — 90472 IMMUNIZATION ADMIN EACH ADD: CPT | Performed by: INTERNAL MEDICINE

## 2023-09-26 PROCEDURE — 90750 HZV VACC RECOMBINANT IM: CPT | Performed by: INTERNAL MEDICINE

## 2023-09-26 PROCEDURE — 1036F TOBACCO NON-USER: CPT | Performed by: INTERNAL MEDICINE

## 2023-09-26 PROCEDURE — 3075F SYST BP GE 130 - 139MM HG: CPT | Performed by: INTERNAL MEDICINE

## 2023-09-26 PROCEDURE — 90471 IMMUNIZATION ADMIN: CPT | Performed by: INTERNAL MEDICINE

## 2023-09-26 PROCEDURE — 99214 OFFICE O/P EST MOD 30 MIN: CPT | Performed by: INTERNAL MEDICINE

## 2023-09-26 PROCEDURE — 3079F DIAST BP 80-89 MM HG: CPT | Performed by: INTERNAL MEDICINE

## 2023-09-26 NOTE — ASSESSMENT & PLAN NOTE
Improvement in LDL cholesterol to 77 with rosuvastatin 10 mg daily continues to experience elevated triglycerides of 310 we will start fish oil supplementation needed 4 g of EPA DHA OTC for ASCVD risk score 6% if no improvement may consider transition to Vascepa or Lovaza if covered by insurance also given educational article on improving triglycerides through diet and exercise triglyceride goal less than 150

## 2023-09-26 NOTE — ASSESSMENT & PLAN NOTE
Recent increase in weight with BMI 32-33 refocus on improving diet and lifestyle changes for overall BMI goal less than 30

## 2023-09-26 NOTE — ASSESSMENT & PLAN NOTE
Recently started AutoPap in August with improving symptoms on nasal pillow mask having some increased condensation issues involving right nasal pillow optimize medical therapy to reduce risk of arrhythmia improve symptoms of daytime hypersomnolence along with lifestyle changes exercise

## 2023-09-26 NOTE — ASSESSMENT & PLAN NOTE
Resolution of ACE inhibitor cough with transition to losartan HCTZ with improvement in blood pressure continue 100/25 1 tablet daily reevaluate 6 months

## 2023-09-26 NOTE — PROGRESS NOTES
Subjective   Reason for Visit: Bubba Garza is an 48 y.o. male here for a fu  visit.     Past Medical, Surgical, and Family History reviewed and updated in chart.    Reviewed all medications by prescribing practitioner or clinical pharmacist (such as prescriptions, OTCs, herbal therapies and supplements) and documented in the medical record.    HPI    Patient Care Team:  Andrey Antonio DO as PCP - General  ISHMAEL Hoover-CNP as PCP - [facility] P[facility]     Review of Systems   All other systems reviewed and are negative.      Objective   Vitals:  /80 (BP Location: Left arm, Patient Position: Sitting)   Pulse 78   Ht 1.829 m (6')   Wt 112 kg (248 lb)   BMI 33.63 kg/m²       Physical Exam  Vitals and nursing note reviewed.   Constitutional:       General: He is not in acute distress.     Appearance: Normal appearance. He is well-developed. He is not toxic-appearing.   HENT:      Head: Normocephalic and atraumatic.      Right Ear: Tympanic membrane and external ear normal.      Left Ear: Tympanic membrane and external ear normal.      Nose: Nose normal.      Mouth/Throat:      Mouth: Mucous membranes are moist.      Pharynx: Oropharynx is clear. No oropharyngeal exudate or posterior oropharyngeal erythema.      Tonsils: No tonsillar exudate. 2+ on the right. 2+ on the left.   Eyes:      Extraocular Movements: Extraocular movements intact.      Conjunctiva/sclera: Conjunctivae normal.   Cardiovascular:      Rate and Rhythm: Normal rate and regular rhythm.      Pulses: Normal pulses.      Heart sounds: Normal heart sounds. No murmur heard.  Pulmonary:      Effort: Pulmonary effort is normal.      Breath sounds: Normal breath sounds.   Abdominal:      General: Abdomen is flat. Bowel sounds are normal.      Palpations: Abdomen is soft.   Musculoskeletal:      Cervical back: Neck supple.   Feet:      Right foot:      Skin integrity: Skin integrity normal. No ulcer, blister, skin breakdown, erythema, warmth or  callus.      Toenail Condition: Right toenails are normal.      Left foot:      Skin integrity: Skin integrity normal. No ulcer, blister, skin breakdown, erythema, warmth or callus.      Toenail Condition: Left toenails are normal.   Lymphadenopathy:      Cervical: No cervical adenopathy.   Skin:     General: Skin is warm and dry.      Capillary Refill: Capillary refill takes more than 3 seconds.      Findings: No rash.   Neurological:      Mental Status: He is alert. Mental status is at baseline.      Sensory: Sensation is intact.   Psychiatric:         Mood and Affect: Mood normal.         Behavior: Behavior normal.         Thought Content: Thought content normal.         Judgment: Judgment normal.         Assessment/Plan   Problem List Items Addressed This Visit       Dyslipidemia, goal LDL below 100    Current Assessment & Plan     Improvement in LDL cholesterol to 77 with rosuvastatin 10 mg daily continues to experience elevated triglycerides of 310 we will start fish oil supplementation needed 4 g of EPA DHA OTC for ASCVD risk score 6% if no improvement may consider transition to Vascepa or Lovaza if covered by insurance also given educational article on improving triglycerides through diet and exercise triglyceride goal less than 150         Relevant Orders    Follow Up [facility] - PCP - Established    Comprehensive metabolic panel    Lipid Panel    Vitamin D 25-Hydroxy,Total (for eval of Vitamin D levels)    Gastroesophageal reflux disease without esophagitis    Current Assessment & Plan     Symptoms controlled on famotidine         Relevant Orders    Follow Up [facility] - PCP - Established    Comprehensive metabolic panel    Lipid Panel    Vitamin D 25-Hydroxy,Total (for eval of Vitamin D levels)    Hypovitaminosis D    Current Assessment & Plan     Start vitamin D 2000 units daily OTC reevaluate with return visit         Relevant Orders    [facility] -  PCP - Established    Comprehensive metabolic panel    Lipid Panel    Vitamin D 25-Hydroxy,Total (for eval of Vitamin D levels)    NSVT (nonsustained ventricular tachycardia) (CMS/HCC)    Current Assessment & Plan     Stable continue with diltiazem 120 mg extended release 1 tablet daily         Relevant Orders    [facility] - PCP - Established    Comprehensive metabolic panel    Lipid Panel    Vitamin D 25-Hydroxy,Total (for eval of Vitamin D levels)    LESLY on CPAP    Current Assessment & Plan     Recently started AutoPap in August with improving symptoms on nasal pillow mask having some increased condensation issues involving right nasal pillow optimize medical therapy to reduce risk of arrhythmia improve symptoms of daytime hypersomnolence along with lifestyle changes exercise         Relevant Orders    Follow Up [facility] - PCP - Established    Comprehensive metabolic panel    Lipid Panel    Vitamin D 25-Hydroxy,Total (for eval of Vitamin D levels)    PVCs (premature ventricular contractions)    Class 1 obesity due to excess calories with serious comorbidity and body mass index (BMI) of 33.0 to 33.9 in adult    Current Assessment & Plan     Recent increase in weight with BMI 32-33 refocus on improving diet and lifestyle changes for overall BMI goal less than 30         Relevant Orders    Follow Up [facility] - PCP - Established    Comprehensive metabolic panel    Lipid Panel    Vitamin D 25-Hydroxy,Total (for eval of Vitamin D levels)    Primary hypertension - Primary    Overview     Start lisinopril hydrochlorothiazide 20/12.51 tablet daily reevaluate in 3 months with blood work         Current Assessment & Plan     Resolution of ACE inhibitor cough with transition to losartan HCTZ with improvement in blood pressure continue 100/25 1 tablet daily reevaluate 6 months         Relevant Orders    Follow Up [facility] - PCP - Established     Comprehensive metabolic panel    Lipid Panel    Vitamin D 25-Hydroxy,Total (for eval of Vitamin D levels)     Other Visit Diagnoses       Need for shingles vaccine        Relevant Orders    Zoster vaccine, recombinant, adult (SHINGRIX) (Completed)    Flu vaccine need        Relevant Orders    Flu vaccine (IIV4) age 6 months and greater, preservative free (Completed)

## 2023-09-26 NOTE — PROGRESS NOTES
Subjective   Patient ID: Bubba Garza is a 48 y.o. male who presents for Follow-up.    HPI     Review of Systems    Objective   /80 (BP Location: Left arm, Patient Position: Sitting)   Pulse 78   Ht 1.829 m (6')   Wt 112 kg (248 lb)   BMI 33.63 kg/m²     Physical Exam    Assessment/Plan

## 2023-12-01 ENCOUNTER — OFFICE VISIT (OUTPATIENT)
Dept: CARDIOLOGY | Facility: CLINIC | Age: 48
End: 2023-12-01
Payer: COMMERCIAL

## 2023-12-01 VITALS
WEIGHT: 245 LBS | DIASTOLIC BLOOD PRESSURE: 90 MMHG | BODY MASS INDEX: 33.18 KG/M2 | HEIGHT: 72 IN | SYSTOLIC BLOOD PRESSURE: 140 MMHG | HEART RATE: 94 BPM

## 2023-12-01 DIAGNOSIS — I47.29 NSVT (NONSUSTAINED VENTRICULAR TACHYCARDIA) (MULTI): Primary | ICD-10-CM

## 2023-12-01 PROCEDURE — 3077F SYST BP >= 140 MM HG: CPT | Performed by: INTERNAL MEDICINE

## 2023-12-01 PROCEDURE — 3008F BODY MASS INDEX DOCD: CPT | Performed by: INTERNAL MEDICINE

## 2023-12-01 PROCEDURE — 99213 OFFICE O/P EST LOW 20 MIN: CPT | Performed by: INTERNAL MEDICINE

## 2023-12-01 PROCEDURE — 93000 ELECTROCARDIOGRAM COMPLETE: CPT | Performed by: INTERNAL MEDICINE

## 2023-12-01 PROCEDURE — 3080F DIAST BP >= 90 MM HG: CPT | Performed by: INTERNAL MEDICINE

## 2023-12-01 PROCEDURE — 1036F TOBACCO NON-USER: CPT | Performed by: INTERNAL MEDICINE

## 2023-12-01 RX ORDER — DILTIAZEM HYDROCHLORIDE 120 MG/1
120 CAPSULE, EXTENDED RELEASE ORAL DAILY
Qty: 90 CAPSULE | Refills: 3 | Status: SHIPPED | OUTPATIENT
Start: 2023-12-01 | End: 2024-03-26 | Stop reason: WASHOUT

## 2023-12-01 ASSESSMENT — ENCOUNTER SYMPTOMS
LIGHT-HEADEDNESS: 0
LOSS OF SENSATION IN FEET: 0
OCCASIONAL FEELINGS OF UNSTEADINESS: 0
DEPRESSION: 0
PALPITATIONS: 1

## 2023-12-01 NOTE — PROGRESS NOTES
Counseling:  The patient was counseled regarding diagnostic results, instructions for management, risk factor reductions, prognosis, patient and family education, impressions, risks and benefits of treatment options and importance of compliance with treatment.       Chief Complaint:   The patient presents today for annual followup of NSVT.     History Of Present Illness:    Bubba Garza is a 48 year old male patient who presents today for annual NSVT. His PMH is significant for obesity, dyslipidemia, GERD, metabolic syndrome X, sleep apnea and NSVT. He is still experiencing palpitations. He denies lightheadedness or faint feelings.       Last Recorded Vitals:  Vitals:    12/01/23 1145   BP: 140/90   Pulse: 94         Past Surgical History:  He has a past surgical history that includes Other surgical history (07/29/2021).      Social History:  He reports that he has never smoked. He has never used smokeless tobacco. He reports that he does not currently use alcohol. He reports that he does not use drugs.     Family History:  Family History          Family History   Problem Relation Name Age of Onset    Diabetes Father        Sleep apnea Brother        Other (S/P CABG (CORONARY ARTERY BYPASS GRAFT)) Maternal Grandmother        Heart attack Maternal Grandfather                Allergies:  Patient has no known allergies.     Outpatient Medications:       Current Outpatient Medications   Medication Instructions    dilTIAZem ER (Tiazac) 120 mg 24 hr capsule 1 capsule, oral, Daily    famotidine (Pepcid) 10 mg tablet 1 tablet, oral, Daily    fluticasone (Flonase) 50 mcg/actuation nasal spray 2 sprays, Each Nostril, Daily, 1 HOUR BEFORE BEDTIME    hydrocortisone 2.5 % cream APPLY SPARINGLY TO AFFECTED AREA(S) TWICE DAILY FOR 14 DAYS    losartan-hydrochlorothiazide (Hyzaar) 100-25 mg tablet 1 tablet, oral, Daily    multivitamin tablet Multi Vitamin Daily Oral Tablet   Refills: 0        Active    rosuvastatin (Crestor) 10 mg  tablet TAKE 1 TABLET BY MOUTH EVERY DAY WITH DINNER      Review of Systems   Cardiovascular:  Positive for palpitations.   Neurological:  Negative for syncope and light-headedness.   All other systems reviewed and are negative.         Physical Exam:  Constitutional:       Appearance: Healthy appearance. Not in distress.   Neck:      Vascular: No JVR. JVD normal.   Pulmonary:      Effort: Pulmonary effort is normal.      Breath sounds: Normal breath sounds. No wheezing. No rhonchi. No rales.   Chest:      Chest wall: Not tender to palpatation.   Cardiovascular:      PMI at left midclavicular line. Normal rate. Regular rhythm. Normal S1. Normal S2.       Murmurs: There is no murmur.      No gallop.  No click. No rub.   Pulses:     Intact distal pulses.   Edema:     Peripheral edema absent.   Abdominal:      General: Bowel sounds are normal.      Palpations: Abdomen is soft.      Tenderness: There is no abdominal tenderness.   Musculoskeletal: Normal range of motion.         General: No tenderness. Skin:     General: Skin is warm and dry.   Neurological:      General: No focal deficit present.      Mental Status: Alert and oriented to person, place and time.            Last Labs:  CBC -        Lab Results   Component Value Date     WBC 6.1 07/06/2021     HGB 15.2 07/06/2021     HCT 45.4 07/06/2021     MCV 88 07/06/2021      07/06/2021         CMP -        Lab Results   Component Value Date     CALCIUM 9.0 09/18/2023     PROT 6.9 09/18/2023     ALBUMIN 4.1 09/18/2023     AST 16 09/18/2023     ALT 26 09/18/2023     ALKPHOS 56 09/18/2023     BILITOT 0.6 09/18/2023         LIPID PANEL -         Lab Results   Component Value Date     CHOL 167 09/18/2023     TRIG 310 (H) 09/18/2023     HDL 28.3 (A) 09/18/2023     CHHDL 5.9 (A) 09/18/2023     LDLF 77 09/18/2023     VLDL 62 (H) 09/18/2023     NHDL 139 09/18/2023         RENAL FUNCTION PANEL -         Lab Results   Component Value Date     GLUCOSE 94 09/18/2023     NA  140 09/18/2023     K 4.2 09/18/2023      (H) 09/18/2023     CO2 25 09/18/2023     ANIONGAP 10 09/18/2023     BUN 13 09/18/2023     CREATININE 1.11 09/18/2023     GFRMALE 81 09/18/2023     CALCIUM 9.0 09/18/2023     ALBUMIN 4.1 09/18/2023               Lab Results   Component Value Date     HGBA1C 5.5 09/18/2023         Last Cardiology Tests:  10/04/2021 - TTE  The left ventricular systolic function is normal with a 60-65% estimated ejection fraction.     09/09/2021 - Cardiac MRI  1. Normal LV size with normal systolic function (LVEF 58%) with no regional wall motion abnormalities  2. On late gadolinium imaging, there is myocardial enhancement at the right ventricular insertion points and the basal septum mid myocardium (non-ischemic). No evidence of prior infarction.  3. Dilated main pulmonary artery measuring up to 3.3 cm, clinically correlate for history of pulmonary hypertension.     07/06/2021 - Holter Monitoring (13d 4h)  1. Average heart rate was 82 bpm, minimum heart rate was 39 bpm on Day 2 / 03:41:59 am, max heart rate was 162 bpm on Day 14 / 01:33:00 pm.  2. Atrial fibrillation burden or flutter: [place] was 0%.  3. PSVC(s): [place] was 0.12%, max count per 24 hours 140.  4. SVT (AT,RT): 16 events, longest event 17 beats on Day 10 / 05:35:36 pm, fastest event 197 bpm on Day 7 / 06:01:59 pm.  5. Pause: 0 events.  6. AV Block: 05%.  7. PVC(s): [place] was 1.77%, max count per 24 hours 2103, 11 disparate morphologies.   8. Ventricular Tachycardia: 9 events, longest event 4 beats at Day 11 / 09:36:48 am, fastest rate 188 bpm at Day 2 / 11:41:13 pm.     07/20/2021 - Exercise Stress Echo  1. Frequent PVCs, Quad PVCs and couplets.  2. No clinical, electrocardiographic or echocardiographic evidence for ischemia at maximal workload.  3. The adequate level of stress was achieved.     Diagnostic review: I have personally reviewed the result(s) of the EKG .        Assessment/Plan   1) Symptomatic PVCs with Rhythm  Strip in [place] Showing 2-3 beats of NSVT  Negative echo and stress test  Holter monitor with PVCs and NSVT  MRI of heart with basal nonischemic scar  EP input appreciated  On diltiazem  Watch carbohydrate intake  No changes at this time  Follow-up in one year      2) Sleep apnea  Being evaluated by Sleep Medicine     Scribe Attestation  By signing my name below, IMalena Scribe   attest that this documentation has been prepared under the direction and in the presence of Armin Perry MD.

## 2024-01-08 ENCOUNTER — TELEPHONE (OUTPATIENT)
Dept: PRIMARY CARE | Facility: CLINIC | Age: 49
End: 2024-01-08
Payer: COMMERCIAL

## 2024-01-08 NOTE — TELEPHONE ENCOUNTER
Patient needs letter for [place] reasons for him to take care of his mother      2-3 days     This month January 2024

## 2024-03-10 DIAGNOSIS — E78.5 HYPERLIPIDEMIA, UNSPECIFIED: ICD-10-CM

## 2024-03-11 RX ORDER — ROSUVASTATIN CALCIUM 10 MG/1
TABLET, COATED ORAL
Qty: 90 TABLET | Refills: 1 | Status: SHIPPED | OUTPATIENT
Start: 2024-03-11

## 2024-03-12 ENCOUNTER — LAB (OUTPATIENT)
Dept: LAB | Facility: LAB | Age: 49
End: 2024-03-12
Payer: COMMERCIAL

## 2024-03-12 DIAGNOSIS — G47.33 OSA ON CPAP: ICD-10-CM

## 2024-03-12 DIAGNOSIS — E55.9 HYPOVITAMINOSIS D: ICD-10-CM

## 2024-03-12 DIAGNOSIS — K21.9 GASTROESOPHAGEAL REFLUX DISEASE WITHOUT ESOPHAGITIS: ICD-10-CM

## 2024-03-12 DIAGNOSIS — I47.29 NSVT (NONSUSTAINED VENTRICULAR TACHYCARDIA) (MULTI): ICD-10-CM

## 2024-03-12 DIAGNOSIS — I10 PRIMARY HYPERTENSION: ICD-10-CM

## 2024-03-12 DIAGNOSIS — E66.09 CLASS 1 OBESITY DUE TO EXCESS CALORIES WITH SERIOUS COMORBIDITY AND BODY MASS INDEX (BMI) OF 33.0 TO 33.9 IN ADULT: ICD-10-CM

## 2024-03-12 DIAGNOSIS — E78.5 DYSLIPIDEMIA, GOAL LDL BELOW 100: ICD-10-CM

## 2024-03-12 LAB
25(OH)D3 SERPL-MCNC: 42 NG/ML (ref 30–100)
ALBUMIN SERPL BCP-MCNC: 3.8 G/DL (ref 3.4–5)
ALP SERPL-CCNC: 47 U/L (ref 33–120)
ALT SERPL W P-5'-P-CCNC: 27 U/L (ref 10–52)
ANION GAP SERPL CALC-SCNC: 8 MMOL/L (ref 10–20)
AST SERPL W P-5'-P-CCNC: 18 U/L (ref 9–39)
BILIRUB SERPL-MCNC: 0.8 MG/DL (ref 0–1.2)
BUN SERPL-MCNC: 17 MG/DL (ref 6–23)
CALCIUM SERPL-MCNC: 9.5 MG/DL (ref 8.6–10.3)
CHLORIDE SERPL-SCNC: 105 MMOL/L (ref 98–107)
CHOLEST SERPL-MCNC: 174 MG/DL (ref 0–199)
CHOLESTEROL/HDL RATIO: 5.7
CO2 SERPL-SCNC: 30 MMOL/L (ref 21–32)
CREAT SERPL-MCNC: 1.15 MG/DL (ref 0.5–1.3)
EGFRCR SERPLBLD CKD-EPI 2021: 78 ML/MIN/1.73M*2
GLUCOSE SERPL-MCNC: 96 MG/DL (ref 74–99)
HDLC SERPL-MCNC: 30.6 MG/DL
LDLC SERPL CALC-MCNC: 95 MG/DL
NON HDL CHOLESTEROL: 143 MG/DL (ref 0–149)
POTASSIUM SERPL-SCNC: 4.2 MMOL/L (ref 3.5–5.3)
PROT SERPL-MCNC: 7.5 G/DL (ref 6.4–8.2)
SODIUM SERPL-SCNC: 139 MMOL/L (ref 136–145)
TRIGL SERPL-MCNC: 240 MG/DL (ref 0–149)
VLDL: 48 MG/DL (ref 0–40)

## 2024-03-12 PROCEDURE — 80053 COMPREHEN METABOLIC PANEL: CPT

## 2024-03-12 PROCEDURE — 36415 COLL VENOUS BLD VENIPUNCTURE: CPT

## 2024-03-12 PROCEDURE — 80061 LIPID PANEL: CPT

## 2024-03-12 PROCEDURE — 82306 VITAMIN D 25 HYDROXY: CPT

## 2024-03-26 ENCOUNTER — OFFICE VISIT (OUTPATIENT)
Dept: PRIMARY CARE | Facility: CLINIC | Age: 49
End: 2024-03-26
Payer: COMMERCIAL

## 2024-03-26 VITALS
BODY MASS INDEX: 33.46 KG/M2 | HEIGHT: 72 IN | DIASTOLIC BLOOD PRESSURE: 80 MMHG | SYSTOLIC BLOOD PRESSURE: 134 MMHG | HEART RATE: 112 BPM | WEIGHT: 247 LBS

## 2024-03-26 DIAGNOSIS — K21.9 GASTROESOPHAGEAL REFLUX DISEASE WITHOUT ESOPHAGITIS: ICD-10-CM

## 2024-03-26 DIAGNOSIS — E55.9 HYPOVITAMINOSIS D: ICD-10-CM

## 2024-03-26 DIAGNOSIS — E66.09 CLASS 1 OBESITY DUE TO EXCESS CALORIES WITH SERIOUS COMORBIDITY AND BODY MASS INDEX (BMI) OF 33.0 TO 33.9 IN ADULT: ICD-10-CM

## 2024-03-26 DIAGNOSIS — I47.29 NSVT (NONSUSTAINED VENTRICULAR TACHYCARDIA) (MULTI): ICD-10-CM

## 2024-03-26 DIAGNOSIS — E78.5 DYSLIPIDEMIA, GOAL LDL BELOW 100: ICD-10-CM

## 2024-03-26 DIAGNOSIS — I10 PRIMARY HYPERTENSION: Primary | ICD-10-CM

## 2024-03-26 DIAGNOSIS — I10 PRIMARY HYPERTENSION: ICD-10-CM

## 2024-03-26 DIAGNOSIS — G47.33 OSA ON CPAP: ICD-10-CM

## 2024-03-26 PROCEDURE — 99214 OFFICE O/P EST MOD 30 MIN: CPT | Performed by: INTERNAL MEDICINE

## 2024-03-26 PROCEDURE — 3075F SYST BP GE 130 - 139MM HG: CPT | Performed by: INTERNAL MEDICINE

## 2024-03-26 PROCEDURE — 1036F TOBACCO NON-USER: CPT | Performed by: INTERNAL MEDICINE

## 2024-03-26 PROCEDURE — 3079F DIAST BP 80-89 MM HG: CPT | Performed by: INTERNAL MEDICINE

## 2024-03-26 PROCEDURE — 3008F BODY MASS INDEX DOCD: CPT | Performed by: INTERNAL MEDICINE

## 2024-03-26 RX ORDER — FLUTICASONE PROPIONATE 50 MCG
2 SPRAY, SUSPENSION (ML) NASAL DAILY
Qty: 16 G | Refills: 11 | Status: SHIPPED | OUTPATIENT
Start: 2024-03-26 | End: 2025-03-26

## 2024-03-26 RX ORDER — DILTIAZEM HYDROCHLORIDE 180 MG/1
180 CAPSULE, COATED, EXTENDED RELEASE ORAL DAILY
Qty: 90 CAPSULE | Refills: 3 | Status: SHIPPED | OUTPATIENT
Start: 2024-03-26 | End: 2025-03-26

## 2024-03-26 NOTE — ASSESSMENT & PLAN NOTE
Blood pressures continue to be elevated continue with losartan hydrochlorothiazide 100/25 1 tablet daily reviewed EKG 12-lead today with sinus rhythm 96 to 100 bpm no evidence of paroxysmal atrial fibrillation or SVT heart rates could be improved will increase diltiazem from 120 to 180 mg 1 tablet daily and reassess follow with cardiology patient denies further episodes of palpitations PSVT at this time symptomatic

## 2024-03-26 NOTE — ASSESSMENT & PLAN NOTE
Plan is to have nasal septal repair in the fall for now continue with fluticasone nasal spray to improve breathing and reinitiate CPAP AutoPap

## 2024-03-26 NOTE — PROGRESS NOTES
Subjective   Patient ID: Bubba Garza is a 48 y.o. male who presents for Follow-up.    HPI     Review of Systems    Objective   /80 (BP Location: Left arm, Patient Position: Sitting)   Pulse (!) 112   Ht 1.829 m (6')   Wt 112 kg (247 lb)   BMI 33.50 kg/m²     Physical Exam    Assessment/Plan

## 2024-03-26 NOTE — ASSESSMENT & PLAN NOTE
Patient continues to struggle with dietary changes with BMI unchanged increase of 2 pounds since last examination follow-up on improving diet exercise lifestyle changes to improve overall blood pressure and sleep apnea along with metabolic risk in the setting of metabolic syndrome

## 2024-03-26 NOTE — PROGRESS NOTES
Subjective   Reason for Visit: Bubba Garza is an 48 y.o. male here for a fu visit.     Past Medical, Surgical, and Family History reviewed and updated in chart.    Reviewed all medications by prescribing practitioner or clinical pharmacist (such as prescriptions, OTCs, herbal therapies and supplements) and documented in the medical record.    HPI    Patient Care Team:  Andrey Antonio DO as PCP - General  Andrey Antonio DO as PCP - [facility] PCP     Review of Systems   All other systems reviewed and are negative.      Objective   Vitals:  /80 (BP Location: Left arm, Patient Position: Sitting)   Pulse (!) 112   Ht 1.829 m (6')   Wt 112 kg (247 lb)   BMI 33.50 kg/m²       Physical Exam  Vitals and nursing note reviewed.   Constitutional:       General: He is not in acute distress.     Appearance: Normal appearance. He is well-developed. He is obese. He is not toxic-appearing.   HENT:      Head: Normocephalic and atraumatic.      Right Ear: Tympanic membrane and external ear normal.      Left Ear: Tympanic membrane and external ear normal.      Nose: Nose normal.      Mouth/Throat:      Mouth: Mucous membranes are moist.      Pharynx: Oropharynx is clear. No oropharyngeal exudate or posterior oropharyngeal erythema.      Tonsils: No tonsillar exudate. 2+ on the right. 2+ on the left.   Eyes:      Extraocular Movements: Extraocular movements intact.      Conjunctiva/sclera: Conjunctivae normal.   Cardiovascular:      Rate and Rhythm: Normal rate and regular rhythm.      Pulses: Normal pulses.      Heart sounds: Normal heart sounds. No murmur heard.  Pulmonary:      Effort: Pulmonary effort is normal.      Breath sounds: Normal breath sounds.   Abdominal:      General: Abdomen is flat. Bowel sounds are normal.      Palpations: Abdomen is soft.   Musculoskeletal:      Cervical back: Neck supple.   Feet:      Right foot:      Skin integrity: Skin integrity normal. No ulcer, blister, skin breakdown,  erythema, warmth or callus.      Toenail Condition: Right toenails are normal.      Left foot:      Skin integrity: Skin integrity normal. No ulcer, blister, skin breakdown, erythema, warmth or callus.      Toenail Condition: Left toenails are normal.   Lymphadenopathy:      Cervical: No cervical adenopathy.   Skin:     General: Skin is warm and dry.      Capillary Refill: Capillary refill takes more than 3 seconds.      Findings: No rash.   Neurological:      Mental Status: He is alert. Mental status is at baseline.      Sensory: Sensation is intact.   Psychiatric:         Mood and Affect: Mood normal.         Behavior: Behavior normal.         Thought Content: Thought content normal.         Judgment: Judgment normal.         Assessment/Plan   Problem List Items Addressed This Visit       Dyslipidemia, goal LDL below 100    Current Assessment & Plan     LDL cholesterol improved to 95 elevated triglycerides given triglyceride reducing diet continue rosuvastatin 10 mg daily reevaluate 6 months         Relevant Medications    fluticasone (Flonase) 50 mcg/actuation nasal spray    Other Relevant Orders    Follow Up In [facility] - PCP - Established    Comprehensive Metabolic Panel    Lipid Panel    Albumin , Urine Random    Gastroesophageal reflux disease without esophagitis    Relevant Medications    fluticasone (Flonase) 50 mcg/actuation nasal spray    Other Relevant Orders    Follow Up [facility] - PCP - Established    Comprehensive Metabolic Panel    Lipid Panel    Albumin , Urine Random    Hypovitaminosis D    Current Assessment & Plan     Vitamin D levels improved on supplemental vitamin D continue         Relevant Medications    fluticasone (Flonase) 50 mcg/actuation nasal spray    Other Relevant Orders    Follow Up [facility] - PCP - Established    Comprehensive Metabolic Panel    Lipid Panel    Albumin , Urine Random    NSVT (nonsustained ventricular tachycardia)  (CMS/[facility])    Current Assessment & Plan     Stable following with cardiologist         Relevant Medications    dilTIAZem CD (Cardizem CD) 180 mg 24 hr capsule    fluticasone (Flonase) 50 mcg/actuation nasal spray    Other Relevant Orders    Follow Up In [facility] - PCP - Established    Comprehensive Metabolic Panel    Lipid Panel    Albumin , Urine Random    LESLY on CPAP    Current Assessment & Plan     Improve adherence to use of AutoPap machine reinitiate with new mask reevaluate when he returns         Relevant Medications    fluticasone (Flonase) 50 mcg/actuation nasal spray    Other Relevant Orders    Follow Up In [facility] - PCP - Established    Comprehensive Metabolic Panel    Lipid Panel    Albumin , Urine Random    Class 1 obesity due to excess calories with serious comorbidity and body mass index (BMI) of 33.0 to 33.9 in adult    Current Assessment & Plan     Patient continues to struggle with dietary changes with BMI unchanged increase of 2 pounds since last examination follow-up on improving diet exercise lifestyle changes to improve overall blood pressure and sleep apnea along with metabolic risk in the setting of metabolic syndrome         Relevant Medications    fluticasone (Flonase) 50 mcg/actuation nasal spray    Other Relevant Orders    Follow Up In [facility] - PCP - Established    Comprehensive Metabolic Panel    Lipid Panel    Albumin , Urine Random    Primary hypertension - Primary    Overview     Start lisinopril hydrochlorothiazide 20/12.51 tablet daily reevaluate in 3 months with blood work         Current Assessment & Plan     Blood pressures continue to be elevated continue with losartan hydrochlorothiazide 100/25 1 tablet daily reviewed EKG 12-lead today with sinus rhythm 96 to 100 bpm no evidence of paroxysmal atrial fibrillation or SVT heart rates could be improved will increase diltiazem from 120 to 180 mg 1 tablet daily and reassess follow with  cardiology patient denies further episodes of palpitations PSVT at this time symptomatic         Relevant Medications    dilTIAZem CD (Cardizem CD) 180 mg 24 hr capsule    fluticasone (Flonase) 50 mcg/actuation nasal spray    Other Relevant Orders    Follow Up In [facility] - PCP - Established    Comprehensive Metabolic Panel    Lipid Panel    Albumin , Urine Random

## 2024-03-26 NOTE — ASSESSMENT & PLAN NOTE
LDL cholesterol improved to 95 elevated triglycerides given triglyceride reducing diet continue rosuvastatin 10 mg daily reevaluate 6 months

## 2024-06-03 DIAGNOSIS — I10 PRIMARY HYPERTENSION: ICD-10-CM

## 2024-06-03 RX ORDER — LOSARTAN POTASSIUM AND HYDROCHLOROTHIAZIDE 25; 100 MG/1; MG/1
1 TABLET ORAL DAILY
Qty: 90 TABLET | Refills: 3 | Status: SHIPPED | OUTPATIENT
Start: 2024-06-03

## 2024-06-30 DIAGNOSIS — E78.5 HYPERLIPIDEMIA, UNSPECIFIED: ICD-10-CM

## 2024-07-01 RX ORDER — ROSUVASTATIN CALCIUM 10 MG/1
TABLET, COATED ORAL
Qty: 90 TABLET | Refills: 1 | Status: SHIPPED | OUTPATIENT
Start: 2024-07-01

## 2024-07-25 ENCOUNTER — APPOINTMENT (OUTPATIENT)
Dept: RADIOLOGY | Facility: HOSPITAL | Age: 49
End: 2024-07-25
Payer: COMMERCIAL

## 2024-07-25 ENCOUNTER — APPOINTMENT (OUTPATIENT)
Dept: CARDIOLOGY | Facility: HOSPITAL | Age: 49
End: 2024-07-25
Payer: COMMERCIAL

## 2024-07-25 ENCOUNTER — HOSPITAL ENCOUNTER (EMERGENCY)
Facility: HOSPITAL | Age: 49
Discharge: HOME | End: 2024-07-26
Attending: STUDENT IN AN ORGANIZED HEALTH CARE EDUCATION/TRAINING PROGRAM
Payer: COMMERCIAL

## 2024-07-25 DIAGNOSIS — R51.9 ACUTE NONINTRACTABLE HEADACHE, UNSPECIFIED HEADACHE TYPE: Primary | ICD-10-CM

## 2024-07-25 LAB
ANION GAP SERPL CALC-SCNC: 12 MMOL/L (ref 10–20)
BASOPHILS # BLD AUTO: 0.05 X10*3/UL (ref 0–0.1)
BASOPHILS NFR BLD AUTO: 0.9 %
BUN SERPL-MCNC: 15 MG/DL (ref 6–23)
CALCIUM SERPL-MCNC: 9.3 MG/DL (ref 8.6–10.3)
CHLORIDE SERPL-SCNC: 97 MMOL/L (ref 98–107)
CO2 SERPL-SCNC: 25 MMOL/L (ref 21–32)
CREAT SERPL-MCNC: 1.3 MG/DL (ref 0.5–1.3)
EGFRCR SERPLBLD CKD-EPI 2021: 67 ML/MIN/1.73M*2
EOSINOPHIL # BLD AUTO: 0.02 X10*3/UL (ref 0–0.7)
EOSINOPHIL NFR BLD AUTO: 0.4 %
ERYTHROCYTE [DISTWIDTH] IN BLOOD BY AUTOMATED COUNT: 12.9 % (ref 11.5–14.5)
FLUAV RNA RESP QL NAA+PROBE: NOT DETECTED
FLUBV RNA RESP QL NAA+PROBE: NOT DETECTED
GLUCOSE SERPL-MCNC: 102 MG/DL (ref 74–99)
HCT VFR BLD AUTO: 43.3 % (ref 41–52)
HGB BLD-MCNC: 15.2 G/DL (ref 13.5–17.5)
IMM GRANULOCYTES # BLD AUTO: 0.04 X10*3/UL (ref 0–0.7)
IMM GRANULOCYTES NFR BLD AUTO: 0.7 % (ref 0–0.9)
LYMPHOCYTES # BLD AUTO: 0.58 X10*3/UL (ref 1.2–4.8)
LYMPHOCYTES NFR BLD AUTO: 10.2 %
MCH RBC QN AUTO: 29.9 PG (ref 26–34)
MCHC RBC AUTO-ENTMCNC: 35.1 G/DL (ref 32–36)
MCV RBC AUTO: 85 FL (ref 80–100)
MONOCYTES # BLD AUTO: 0.65 X10*3/UL (ref 0.1–1)
MONOCYTES NFR BLD AUTO: 11.5 %
NEUTROPHILS # BLD AUTO: 4.32 X10*3/UL (ref 1.2–7.7)
NEUTROPHILS NFR BLD AUTO: 76.3 %
NRBC BLD-RTO: 0 /100 WBCS (ref 0–0)
PLATELET # BLD AUTO: 167 X10*3/UL (ref 150–450)
POTASSIUM SERPL-SCNC: 3.3 MMOL/L (ref 3.5–5.3)
RBC # BLD AUTO: 5.09 X10*6/UL (ref 4.5–5.9)
SARS-COV-2 RNA RESP QL NAA+PROBE: NOT DETECTED
SODIUM SERPL-SCNC: 131 MMOL/L (ref 136–145)
WBC # BLD AUTO: 5.7 X10*3/UL (ref 4.4–11.3)

## 2024-07-25 PROCEDURE — 96361 HYDRATE IV INFUSION ADD-ON: CPT

## 2024-07-25 PROCEDURE — 2500000004 HC RX 250 GENERAL PHARMACY W/ HCPCS (ALT 636 FOR OP/ED): Performed by: STUDENT IN AN ORGANIZED HEALTH CARE EDUCATION/TRAINING PROGRAM

## 2024-07-25 PROCEDURE — 71045 X-RAY EXAM CHEST 1 VIEW: CPT

## 2024-07-25 PROCEDURE — 99285 EMERGENCY DEPT VISIT HI MDM: CPT | Mod: 25

## 2024-07-25 PROCEDURE — 87636 SARSCOV2 & INF A&B AMP PRB: CPT | Performed by: STUDENT IN AN ORGANIZED HEALTH CARE EDUCATION/TRAINING PROGRAM

## 2024-07-25 PROCEDURE — 82374 ASSAY BLOOD CARBON DIOXIDE: CPT | Performed by: STUDENT IN AN ORGANIZED HEALTH CARE EDUCATION/TRAINING PROGRAM

## 2024-07-25 PROCEDURE — 93005 ELECTROCARDIOGRAM TRACING: CPT

## 2024-07-25 PROCEDURE — 96374 THER/PROPH/DIAG INJ IV PUSH: CPT

## 2024-07-25 PROCEDURE — 36415 COLL VENOUS BLD VENIPUNCTURE: CPT | Performed by: STUDENT IN AN ORGANIZED HEALTH CARE EDUCATION/TRAINING PROGRAM

## 2024-07-25 PROCEDURE — 85025 COMPLETE CBC W/AUTO DIFF WBC: CPT | Performed by: STUDENT IN AN ORGANIZED HEALTH CARE EDUCATION/TRAINING PROGRAM

## 2024-07-25 PROCEDURE — 71045 X-RAY EXAM CHEST 1 VIEW: CPT | Performed by: RADIOLOGY

## 2024-07-25 RX ORDER — PROCHLORPERAZINE EDISYLATE 5 MG/ML
10 INJECTION INTRAMUSCULAR; INTRAVENOUS ONCE
Status: COMPLETED | OUTPATIENT
Start: 2024-07-25 | End: 2024-07-25

## 2024-07-25 RX ORDER — ACETAMINOPHEN 325 MG/1
975 TABLET ORAL ONCE
Status: COMPLETED | OUTPATIENT
Start: 2024-07-25 | End: 2024-07-25

## 2024-07-25 ASSESSMENT — PAIN - FUNCTIONAL ASSESSMENT: PAIN_FUNCTIONAL_ASSESSMENT: 0-10

## 2024-07-25 ASSESSMENT — LIFESTYLE VARIABLES
TOTAL SCORE: 0
EVER FELT BAD OR GUILTY ABOUT YOUR DRINKING: NO
HAVE YOU EVER FELT YOU SHOULD CUT DOWN ON YOUR DRINKING: NO
EVER HAD A DRINK FIRST THING IN THE MORNING TO STEADY YOUR NERVES TO GET RID OF A HANGOVER: NO
HAVE PEOPLE ANNOYED YOU BY CRITICIZING YOUR DRINKING: NO

## 2024-07-25 ASSESSMENT — PAIN DESCRIPTION - LOCATION: LOCATION: FACE

## 2024-07-25 ASSESSMENT — PAIN DESCRIPTION - FREQUENCY: FREQUENCY: CONSTANT/CONTINUOUS

## 2024-07-25 ASSESSMENT — COLUMBIA-SUICIDE SEVERITY RATING SCALE - C-SSRS
6. HAVE YOU EVER DONE ANYTHING, STARTED TO DO ANYTHING, OR PREPARED TO DO ANYTHING TO END YOUR LIFE?: NO
1. IN THE PAST MONTH, HAVE YOU WISHED YOU WERE DEAD OR WISHED YOU COULD GO TO SLEEP AND NOT WAKE UP?: NO
2. HAVE YOU ACTUALLY HAD ANY THOUGHTS OF KILLING YOURSELF?: NO

## 2024-07-25 ASSESSMENT — PAIN DESCRIPTION - PAIN TYPE: TYPE: ACUTE PAIN

## 2024-07-25 ASSESSMENT — PAIN SCALES - GENERAL: PAINLEVEL_OUTOF10: 6

## 2024-07-25 ASSESSMENT — PAIN DESCRIPTION - DESCRIPTORS: DESCRIPTORS: PRESSURE;ACHING

## 2024-07-25 ASSESSMENT — PAIN DESCRIPTION - ORIENTATION: ORIENTATION: UPPER

## 2024-07-26 ENCOUNTER — APPOINTMENT (OUTPATIENT)
Dept: RADIOLOGY | Facility: HOSPITAL | Age: 49
End: 2024-07-26
Payer: COMMERCIAL

## 2024-07-26 VITALS
RESPIRATION RATE: 18 BRPM | BODY MASS INDEX: 33.86 KG/M2 | HEIGHT: 72 IN | DIASTOLIC BLOOD PRESSURE: 94 MMHG | OXYGEN SATURATION: 97 % | TEMPERATURE: 98.9 F | WEIGHT: 250 LBS | HEART RATE: 90 BPM | SYSTOLIC BLOOD PRESSURE: 144 MMHG

## 2024-07-26 LAB
ATRIAL RATE: 112 BPM
P AXIS: 69 DEGREES
PR INTERVAL: 140 MS
Q ONSET: 249 MS
QRS COUNT: 18 BEATS
QRS DURATION: 106 MS
QT INTERVAL: 308 MS
QTC CALCULATION(BAZETT): 419 MS
QTC FREDERICIA: 378 MS
R AXIS: 65 DEGREES
T AXIS: 17 DEGREES
T OFFSET: 403 MS
VENTRICULAR RATE: 111 BPM

## 2024-07-26 PROCEDURE — 70450 CT HEAD/BRAIN W/O DYE: CPT | Performed by: SURGERY

## 2024-07-26 PROCEDURE — 70450 CT HEAD/BRAIN W/O DYE: CPT

## 2024-07-26 NOTE — ED PROVIDER NOTES
HPI   Chief Complaint   Patient presents with    Facial Pain    rast HR     Pt c/o intermittent fast HR for several months has seen  And on meds  denies chest pain SOB    occasional dizziness  todays heart rate in 120's and concerned   also c/o sinus pressure since yesterday  hot and cold chills  did not take temp  Aleve this am   Cardizem  1000        49-year-old male with past medical history of nonsustained V. tach, hyperlipidemia, hypertension presents ED with concerns for headache.  Patient states has frontal headache which described as a pressure sensation for the last 2 days.  She also had some bodyaches.  Also felt hot and cold type symptoms.  Denies nasal congestion sore throat.  No cough or congestion.  No chest pain or shortness of breath.  No abdominal pain, nausea vomiting or diarrhea.  No dysuria, hematuria or frequency.  No rashes.  Patient also says his heart rates been elevated at home in the low 100s.  No neck pain.  No recent falls or head trauma.  Headaches been progressive in nature.              Patient History   Past Medical History:   Diagnosis Date    Heartburn 11/17/2021    Mild heartburn    Obesity, unspecified 08/31/2021    Obesity (BMI 30.0-34.9)    Other obesity due to excess calories 02/01/2022    Class 1 obesity due to excess calories with serious comorbidity and body mass index (BMI) of 30.0 to 30.9 in adult    Personal history of other specified conditions     History of palpitations    Personal history of other specified conditions 08/31/2021    History of nasal congestion     Past Surgical History:   Procedure Laterality Date    OTHER SURGICAL HISTORY  07/29/2021    Hemorrhoidectomy     Family History   Problem Relation Name Age of Onset    Diabetes Father      Sleep apnea Brother      Other (S/P CABG (CORONARY ARTERY BYPASS GRAFT)) Maternal Grandmother      Heart attack Maternal Grandfather       Social History     Tobacco Use    Smoking status: Never    Smokeless tobacco:  Never   Vaping Use    Vaping status: Never Used   Substance Use Topics    Alcohol use: Not Currently     Comment: occassionally    Drug use: Never       Physical Exam   ED Triage Vitals [07/25/24 2203]   Temperature Heart Rate Respirations BP   37.8 °C (100 °F) (!) 112 20 (!) 146/91      Pulse Ox Temp Source Heart Rate Source Patient Position   96 % Tympanic Monitor Sitting      BP Location FiO2 (%)     Left arm --       Physical Exam  Vitals and nursing note reviewed.   Constitutional:       General: He is not in acute distress.     Appearance: He is well-developed.   HENT:      Head: Normocephalic and atraumatic.      Right Ear: Tympanic membrane normal.      Left Ear: Tympanic membrane normal.      Nose: Nose normal.      Mouth/Throat:      Mouth: Mucous membranes are moist.      Pharynx: Oropharynx is clear.   Eyes:      Conjunctiva/sclera: Conjunctivae normal.   Cardiovascular:      Rate and Rhythm: Normal rate and regular rhythm.      Heart sounds: No murmur heard.  Pulmonary:      Effort: Pulmonary effort is normal. No respiratory distress.      Breath sounds: Normal breath sounds.   Abdominal:      Palpations: Abdomen is soft.      Tenderness: There is no abdominal tenderness.   Musculoskeletal:         General: No swelling.      Cervical back: Neck supple. No rigidity or tenderness.   Skin:     General: Skin is warm and dry.      Capillary Refill: Capillary refill takes less than 2 seconds.   Neurological:      General: No focal deficit present.      Mental Status: He is alert and oriented to person, place, and time.      Cranial Nerves: No cranial nerve deficit.      Sensory: No sensory deficit.      Motor: No weakness.      Coordination: Coordination normal.      Gait: Gait normal.   Psychiatric:         Mood and Affect: Mood normal.           ED Course & MDM   Diagnoses as of 07/26/24 0114   Acute nonintractable headache, unspecified headache type                       Phyllis Coma Scale Score: 15                         Medical Decision Making  HISTORIAN:  Patient    CHART REVIEW:  No pertinent finding    PT SUMMARY:  49-year-old male presents ED with headache has been progressive.  Vital signs stable.    DDX:  Primary headache, SAH, meningitis, intracranial mass, cerebral venous thrombosis, Intracranial hypertension      PLAN:  Will obtain CBC, BMP, CT brain, chest x-ray, viral testing    DISPO/RE-EVAL:  EKG shows sinus tachycardia with no new ischemic changes, normal intervals and axis.  Labs show some mild hyponatremia and hypokalemia.  Otherwise lab testing unremarkable.  CT brain and chest x-ray negative.  Patient received symptomatic control.  On reevaluation patient feels his headache is improved.  Vital signs improved as well.  With his negative workup.  Is possible that he has a viral syndrome.  Low suspicion for meningitis he has no neck pain and no meningismus or neck rigidity on exam.  Since he is improved we will discharge patient home with follow-up from primary care.  Advised to come back to ED for any new or worsening symptoms          Procedure  Procedures     Contreras Dan,   07/26/24 0115       Contreras Dan,   08/22/24 1552

## 2024-07-26 NOTE — DISCHARGE INSTRUCTIONS
Come back to the ED for any new or worsening symptoms such as worsening headache, vision changes, weakness or numbness to extremities, worsening neck pain or continued fevers

## 2024-09-26 ENCOUNTER — APPOINTMENT (OUTPATIENT)
Dept: PRIMARY CARE | Facility: CLINIC | Age: 49
End: 2024-09-26
Payer: COMMERCIAL

## 2024-10-28 ENCOUNTER — LAB (OUTPATIENT)
Dept: LAB | Facility: LAB | Age: 49
End: 2024-10-28
Payer: COMMERCIAL

## 2024-10-28 DIAGNOSIS — K21.9 GASTROESOPHAGEAL REFLUX DISEASE WITHOUT ESOPHAGITIS: ICD-10-CM

## 2024-10-28 DIAGNOSIS — E78.5 DYSLIPIDEMIA, GOAL LDL BELOW 100: ICD-10-CM

## 2024-10-28 DIAGNOSIS — I47.29 NSVT (NONSUSTAINED VENTRICULAR TACHYCARDIA) (MULTI): ICD-10-CM

## 2024-10-28 DIAGNOSIS — I10 PRIMARY HYPERTENSION: ICD-10-CM

## 2024-10-28 DIAGNOSIS — G47.33 OSA ON CPAP: ICD-10-CM

## 2024-10-28 DIAGNOSIS — E66.09 CLASS 1 OBESITY DUE TO EXCESS CALORIES WITH SERIOUS COMORBIDITY AND BODY MASS INDEX (BMI) OF 33.0 TO 33.9 IN ADULT: ICD-10-CM

## 2024-10-28 DIAGNOSIS — E55.9 HYPOVITAMINOSIS D: ICD-10-CM

## 2024-10-28 DIAGNOSIS — E66.811 CLASS 1 OBESITY DUE TO EXCESS CALORIES WITH SERIOUS COMORBIDITY AND BODY MASS INDEX (BMI) OF 33.0 TO 33.9 IN ADULT: ICD-10-CM

## 2024-10-28 LAB
ALBUMIN SERPL BCP-MCNC: 4.4 G/DL (ref 3.4–5)
ALP SERPL-CCNC: 50 U/L (ref 33–120)
ALT SERPL W P-5'-P-CCNC: 33 U/L (ref 10–52)
ANION GAP SERPL CALC-SCNC: 11 MMOL/L (ref 10–20)
AST SERPL W P-5'-P-CCNC: 20 U/L (ref 9–39)
BILIRUB SERPL-MCNC: 0.7 MG/DL (ref 0–1.2)
BUN SERPL-MCNC: 19 MG/DL (ref 6–23)
CALCIUM SERPL-MCNC: 9.2 MG/DL (ref 8.6–10.3)
CHLORIDE SERPL-SCNC: 104 MMOL/L (ref 98–107)
CHOLEST SERPL-MCNC: 154 MG/DL (ref 0–199)
CHOLESTEROL/HDL RATIO: 5.3
CO2 SERPL-SCNC: 28 MMOL/L (ref 21–32)
CREAT SERPL-MCNC: 1.04 MG/DL (ref 0.5–1.3)
CREAT UR-MCNC: 227 MG/DL (ref 20–370)
EGFRCR SERPLBLD CKD-EPI 2021: 87 ML/MIN/1.73M*2
GLUCOSE SERPL-MCNC: 103 MG/DL (ref 74–99)
HDLC SERPL-MCNC: 29.3 MG/DL
LDLC SERPL CALC-MCNC: 85 MG/DL
MICROALBUMIN UR-MCNC: 7.7 MG/L
MICROALBUMIN/CREAT UR: 3.4 UG/MG CREAT
NON HDL CHOLESTEROL: 125 MG/DL (ref 0–149)
POTASSIUM SERPL-SCNC: 3.8 MMOL/L (ref 3.5–5.3)
PROT SERPL-MCNC: 7.1 G/DL (ref 6.4–8.2)
SODIUM SERPL-SCNC: 139 MMOL/L (ref 136–145)
TRIGL SERPL-MCNC: 200 MG/DL (ref 0–149)
VLDL: 40 MG/DL (ref 0–40)

## 2024-10-28 PROCEDURE — 80061 LIPID PANEL: CPT

## 2024-10-28 PROCEDURE — 82043 UR ALBUMIN QUANTITATIVE: CPT

## 2024-10-28 PROCEDURE — 82570 ASSAY OF URINE CREATININE: CPT

## 2024-10-28 PROCEDURE — 36415 COLL VENOUS BLD VENIPUNCTURE: CPT

## 2024-10-28 PROCEDURE — 80053 COMPREHEN METABOLIC PANEL: CPT

## 2024-11-05 ENCOUNTER — APPOINTMENT (OUTPATIENT)
Dept: PRIMARY CARE | Facility: CLINIC | Age: 49
End: 2024-11-05
Payer: COMMERCIAL

## 2024-11-05 VITALS
BODY MASS INDEX: 33.46 KG/M2 | HEART RATE: 97 BPM | HEIGHT: 72 IN | SYSTOLIC BLOOD PRESSURE: 130 MMHG | WEIGHT: 247 LBS | DIASTOLIC BLOOD PRESSURE: 80 MMHG

## 2024-11-05 DIAGNOSIS — I47.29 NSVT (NONSUSTAINED VENTRICULAR TACHYCARDIA) (MULTI): ICD-10-CM

## 2024-11-05 DIAGNOSIS — E55.9 HYPOVITAMINOSIS D: ICD-10-CM

## 2024-11-05 DIAGNOSIS — Z23 FLU VACCINE NEED: ICD-10-CM

## 2024-11-05 DIAGNOSIS — E78.5 DYSLIPIDEMIA, GOAL LDL BELOW 100: ICD-10-CM

## 2024-11-05 DIAGNOSIS — G47.33 OSA ON CPAP: ICD-10-CM

## 2024-11-05 DIAGNOSIS — E66.811 CLASS 1 OBESITY DUE TO EXCESS CALORIES WITH SERIOUS COMORBIDITY AND BODY MASS INDEX (BMI) OF 33.0 TO 33.9 IN ADULT: ICD-10-CM

## 2024-11-05 DIAGNOSIS — I10 PRIMARY HYPERTENSION: Primary | ICD-10-CM

## 2024-11-05 DIAGNOSIS — E66.09 CLASS 1 OBESITY DUE TO EXCESS CALORIES WITH SERIOUS COMORBIDITY AND BODY MASS INDEX (BMI) OF 33.0 TO 33.9 IN ADULT: ICD-10-CM

## 2024-11-05 DIAGNOSIS — K21.9 GASTROESOPHAGEAL REFLUX DISEASE WITHOUT ESOPHAGITIS: ICD-10-CM

## 2024-11-05 PROCEDURE — 1036F TOBACCO NON-USER: CPT | Performed by: INTERNAL MEDICINE

## 2024-11-05 PROCEDURE — 3079F DIAST BP 80-89 MM HG: CPT | Performed by: INTERNAL MEDICINE

## 2024-11-05 PROCEDURE — 99213 OFFICE O/P EST LOW 20 MIN: CPT | Performed by: INTERNAL MEDICINE

## 2024-11-05 PROCEDURE — 90471 IMMUNIZATION ADMIN: CPT | Performed by: INTERNAL MEDICINE

## 2024-11-05 PROCEDURE — 90656 IIV3 VACC NO PRSV 0.5 ML IM: CPT | Performed by: INTERNAL MEDICINE

## 2024-11-05 PROCEDURE — 3008F BODY MASS INDEX DOCD: CPT | Performed by: INTERNAL MEDICINE

## 2024-11-05 PROCEDURE — 3075F SYST BP GE 130 - 139MM HG: CPT | Performed by: INTERNAL MEDICINE

## 2024-11-05 RX ORDER — FLUTICASONE PROPIONATE 50 MCG
2 SPRAY, SUSPENSION (ML) NASAL DAILY
Qty: 16 G | Refills: 11 | Status: SHIPPED | OUTPATIENT
Start: 2024-11-05 | End: 2025-11-05

## 2024-11-05 RX ORDER — FAMOTIDINE 10 MG/1
10 TABLET ORAL NIGHTLY
Qty: 90 TABLET | Refills: 3 | Status: SHIPPED | OUTPATIENT
Start: 2024-11-05 | End: 2025-11-05

## 2024-11-05 RX ORDER — DILTIAZEM HYDROCHLORIDE 240 MG/1
240 CAPSULE, COATED, EXTENDED RELEASE ORAL DAILY
Qty: 90 CAPSULE | Refills: 3 | Status: SHIPPED | OUTPATIENT
Start: 2024-11-05 | End: 2025-11-05

## 2024-11-05 RX ORDER — ROSUVASTATIN CALCIUM 10 MG/1
10 TABLET, COATED ORAL
Qty: 90 TABLET | Refills: 3 | Status: SHIPPED | OUTPATIENT
Start: 2024-11-05 | End: 2024-11-10 | Stop reason: SDUPTHER

## 2024-11-05 RX ORDER — LOSARTAN POTASSIUM AND HYDROCHLOROTHIAZIDE 25; 100 MG/1; MG/1
1 TABLET ORAL DAILY
Qty: 90 TABLET | Refills: 3 | Status: SHIPPED | OUTPATIENT
Start: 2024-11-05

## 2024-11-05 NOTE — ASSESSMENT & PLAN NOTE
Increase diltiazem CD from 1 8240 mg 1 tablet daily continue with losartan hydrochlorothiazide 100/25 1 tablet daily  Orders:    Follow Up In [facility] - PCP - Established    dilTIAZem CD (Cardizem CD) 240 mg 24 hr capsule; Take 1 capsule (240 mg) by mouth once daily.    fluticasone (Flonase) 50 mcg/actuation nasal spray; Administer 2 sprays into each nostril once daily. 1 HOUR BEFORE BEDTIME

## 2024-11-05 NOTE — ASSESSMENT & PLAN NOTE
Continues famotidine 10 mg nightly  Orders:    Follow Up In [facility] - PCP - Established    fluticasone (Flonase) 50 mcg/actuation nasal spray; Administer 2 sprays into each nostril once daily. 1 HOUR BEFORE BEDTIME    famotidine (Pepcid) 10 mg tablet; Take 1 tablet (10 mg) by mouth once daily at bedtime.

## 2024-11-05 NOTE — ASSESSMENT & PLAN NOTE
Start rosuvastatin 10 mg daily refilled optimize LDL goal less than 70 with LDL of 85 triglyceride level 200 mild impaired glucose tolerance no evidence of microalbuminuria updated influenza vaccine in office today  Orders:    Follow Up In [facility] - PCP - Established    fluticasone (Flonase) 50 mcg/actuation nasal spray; Administer 2 sprays into each nostril once daily. 1 HOUR BEFORE BEDTIME    rosuvastatin (Crestor) 10 mg tablet; Take 1 tablet (10 mg) by mouth once daily in the evening. Take with meals.

## 2024-11-05 NOTE — ASSESSMENT & PLAN NOTE
Improvement in overall blood pressure control with losartan HCTZ and diltiazem patient continues to have episodes of S VT type symptoms and palpitations will increase diltiazem from 1 80-2 40 and reevaluate with next visit along with cardiologist next visit  Orders:    Follow Up In [facility] - PCP - Established    dilTIAZem CD (Cardizem CD) 240 mg 24 hr capsule; Take 1 capsule (240 mg) by mouth once daily.    fluticasone (Flonase) 50 mcg/actuation nasal spray; Administer 2 sprays into each nostril once daily. 1 HOUR BEFORE BEDTIME    losartan-hydrochlorothiazide (Hyzaar) 100-25 mg tablet; Take 1 tablet by mouth once daily.    Follow Up In [facility] - PCP - Established; Future

## 2024-11-05 NOTE — ASSESSMENT & PLAN NOTE
Patient is still working getting CPAP corrected working with sleep specialist for appropriate fitting of mask to improve overall adherence reevaluate next visit  Orders:    Follow Up In [facility] - PCP - Established    fluticasone (Flonase) 50 mcg/actuation nasal spray; Administer 2 sprays into each nostril once daily. 1 HOUR BEFORE BEDTIME

## 2024-11-05 NOTE — ASSESSMENT & PLAN NOTE
Continue working strategies through resistance training regular exercise and therapy along with caloric restriction to improve BMI less than 30 reevaluate next visit  Orders:    Follow Up In [facility] - PCP - Established    fluticasone (Flonase) 50 mcg/actuation nasal spray; Administer 2 sprays into each nostril once daily. 1 HOUR BEFORE BEDTIME

## 2024-11-05 NOTE — PROGRESS NOTES
Subjective   Patient ID: Bubba Garza is a 49 y.o. male who presents for Follow-up.    HPI     Review of Systems    Objective   /80   Pulse 97   Ht 1.829 m (6')   Wt 112 kg (247 lb)   BMI 33.50 kg/m²     Physical Exam    Assessment/Plan

## 2024-11-05 NOTE — ASSESSMENT & PLAN NOTE
Updated influenza vaccine in office today  Orders:    Flu vaccine, trivalent, preservative free, age 6 months and greater (Fluarix/Fluzone/Flulaval)

## 2024-11-05 NOTE — ASSESSMENT & PLAN NOTE
Reevaluate vitamin D levels on replacement therapy  Orders:    Follow Up In [facility] - PCP - Established    fluticasone (Flonase) 50 mcg/actuation nasal spray; Administer 2 sprays into each nostril once daily. 1 HOUR BEFORE BEDTIME

## 2024-11-05 NOTE — PROGRESS NOTES
Subjective   Reason for Visit: Bubba Garza is an 49 y.o. male here for a Medicare Wellness visit.     Past Medical, Surgical, and Family History reviewed and updated in chart.         HPI    Patient Care Team:  Andrey Antonio DO as PCP - General (Internal Medicine)  Andrey Antonio DO as PCP - [facility] PCP     Review of Systems   All other systems reviewed and are negative.      Objective   Vitals:  /80   Pulse 97   Ht 1.829 m (6')   Wt 112 kg (247 lb)   BMI 33.50 kg/m²       Physical Exam  Vitals and nursing note reviewed.   Constitutional:       General: He is not in acute distress.     Appearance: Normal appearance. He is well-developed. He is not toxic-appearing.   HENT:      Head: Normocephalic and atraumatic.      Right Ear: Tympanic membrane and external ear normal.      Left Ear: Tympanic membrane and external ear normal.      Nose: Nose normal.      Mouth/Throat:      Mouth: Mucous membranes are moist.      Pharynx: Oropharynx is clear. No oropharyngeal exudate or posterior oropharyngeal erythema.      Tonsils: No tonsillar exudate. 2+ on the right. 2+ on the left.   Eyes:      Extraocular Movements: Extraocular movements intact.      Conjunctiva/sclera: Conjunctivae normal.   Cardiovascular:      Rate and Rhythm: Normal rate and regular rhythm.      Pulses: Normal pulses.      Heart sounds: Normal heart sounds. No murmur heard.  Pulmonary:      Effort: Pulmonary effort is normal.      Breath sounds: Normal breath sounds.   Abdominal:      General: Abdomen is flat. Bowel sounds are normal.      Palpations: Abdomen is soft.   Musculoskeletal:      Cervical back: Neck supple.   Feet:      Right foot:      Skin integrity: Skin integrity normal. No ulcer, blister, skin breakdown, erythema, warmth or callus.      Toenail Condition: Right toenails are normal.      Left foot:      Skin integrity: Skin integrity normal. No ulcer, blister, skin breakdown, erythema, warmth or callus.      Toenail  Condition: Left toenails are normal.   Lymphadenopathy:      Cervical: No cervical adenopathy.   Skin:     General: Skin is warm and dry.      Capillary Refill: Capillary refill takes more than 3 seconds.      Findings: No rash.   Neurological:      Mental Status: He is alert. Mental status is at baseline.      Sensory: Sensation is intact.   Psychiatric:         Mood and Affect: Mood normal.         Behavior: Behavior normal.         Thought Content: Thought content normal.         Judgment: Judgment normal.       Lifestyle Recommendations  I recommend a whole-food plant-based diet, an eating pattern that encourages the consumption of unrefined plant foods (such as fruits, vegetables, tubers, whole grains, legumes, nuts and seeds) and discourages meats, dairy products, eggs and processed foods.     The AHA/ACC recommends that the patient consume a dietary pattern that emphasizes intake of vegetables, fruits, and whole grains; includes low-fat dairy products, poultry, fish, legumes, non-tropical vegetable oils, and nuts; and limits intake of sodium, sweets, sugar-sweetened beverages, and red meats.  Adapt this dietary pattern to appropriate calorie requirements (a 500-750 kcal/day deficit to loose weight), personal and cultural food preferences, and nutrition therapy for other medical conditions (including diabetes).  Achieve this pattern by following plans such as the [place] Mediterranean, DASH dietary pattern, or AHA diet.     Engage in 2 hours and 30 minutes per week of moderate-intensity physical activity, or 1 hour and 15 minutes (75 minutes) per week of vigorous-intensity aerobic physical activity, or an equivalent combination of moderate and vigorous-intensity aerobic physical activity. Aerobic activity should be performed in episodes of at least 10 minutes preferably spread throughout the week.     Adhering to a heart healthy diet, regular exercise habits, avoidance of tobacco products, and maintenance of a  healthy weight are crucial components of their heart disease risk reduction.  Assessment & Plan  Primary hypertension  Improvement in overall blood pressure control with losartan HCTZ and diltiazem patient continues to have episodes of S VT type symptoms and palpitations will increase diltiazem from 1 80-2 40 and reevaluate with next visit along with cardiologist next visit  Orders:    Follow Up In [facility] - PCP - Established    dilTIAZem CD (Cardizem CD) 240 mg 24 hr capsule; Take 1 capsule (240 mg) by mouth once daily.    fluticasone (Flonase) 50 mcg/actuation nasal spray; Administer 2 sprays into each nostril once daily. 1 HOUR BEFORE BEDTIME    losartan-hydrochlorothiazide (Hyzaar) 100-25 mg tablet; Take 1 tablet by mouth once daily.    Follow Up In [facility] - PCP - Established; Future    Hypovitaminosis D  Reevaluate vitamin D levels on replacement therapy  Orders:    Follow Up In [facility] - PCP - Established    fluticasone (Flonase) 50 mcg/actuation nasal spray; Administer 2 sprays into each nostril once daily. 1 HOUR BEFORE BEDTIME    NSVT (nonsustained ventricular tachycardia) (Multi)  Increase diltiazem CD from 1 8240 mg 1 tablet daily continue with losartan hydrochlorothiazide 100/25 1 tablet daily  Orders:    Follow Up In [facility] - PCP - Established    dilTIAZem CD (Cardizem CD) 240 mg 24 hr capsule; Take 1 capsule (240 mg) by mouth once daily.    fluticasone (Flonase) 50 mcg/actuation nasal spray; Administer 2 sprays into each nostril once daily. 1 HOUR BEFORE BEDTIME    LESLY on CPAP  Patient is still working getting CPAP corrected working with sleep specialist for appropriate fitting of mask to improve overall adherence reevaluate next visit  Orders:    Follow Up [facility] - PCP - Established    fluticasone (Flonase) 50 mcg/actuation nasal spray; Administer 2 sprays into each nostril once daily. 1 HOUR BEFORE BEDTIME    Gastroesophageal  reflux disease without esophagitis  Continues famotidine 10 mg nightly  Orders:    Follow Up [facility] - PCP - Established    fluticasone (Flonase) 50 mcg/actuation nasal spray; Administer 2 sprays into each nostril once daily. 1 HOUR BEFORE BEDTIME    famotidine (Pepcid) 10 mg tablet; Take 1 tablet (10 mg) by mouth once daily at bedtime.    Class 1 obesity due to excess calories with serious comorbidity and body mass index (BMI) of 33.0 to 33.9 in adult  Continue working strategies through resistance training regular exercise and therapy along with caloric restriction to improve BMI less than 30 reevaluate next visit  Orders:    Follow Up In [facility] - PCP - Established    fluticasone (Flonase) 50 mcg/actuation nasal spray; Administer 2 sprays into each nostril once daily. 1 HOUR BEFORE BEDTIME    Dyslipidemia, goal LDL below 100  Start rosuvastatin 10 mg daily refilled optimize LDL goal less than 70 with LDL of 85 triglyceride level 200 mild impaired glucose tolerance no evidence of microalbuminuria updated influenza vaccine in office today  Orders:    Follow Up In [facility] - PCP - Established    fluticasone (Flonase) 50 mcg/actuation nasal spray; Administer 2 sprays into each nostril once daily. 1 HOUR BEFORE BEDTIME    rosuvastatin (Crestor) 10 mg tablet; Take 1 tablet (10 mg) by mouth once daily in the evening. Take with meals.    Flu vaccine need  Updated influenza vaccine in office today  Orders:    Flu vaccine, trivalent, preservative free, age 6 months and greater (Fluarix/Fluzone/Flulaval)

## 2024-11-10 RX ORDER — ROSUVASTATIN CALCIUM 10 MG/1
10 TABLET, COATED ORAL
Qty: 90 TABLET | Refills: 3 | Status: SHIPPED | OUTPATIENT
Start: 2024-11-10 | End: 2025-11-10

## 2024-12-02 ENCOUNTER — APPOINTMENT (OUTPATIENT)
Dept: CARDIOLOGY | Facility: HOSPITAL | Age: 49
End: 2024-12-02
Payer: COMMERCIAL

## 2024-12-02 ENCOUNTER — ANCILLARY PROCEDURE (OUTPATIENT)
Dept: CARDIOLOGY | Facility: HOSPITAL | Age: 49
End: 2024-12-02
Payer: COMMERCIAL

## 2024-12-02 VITALS
HEART RATE: 80 BPM | DIASTOLIC BLOOD PRESSURE: 90 MMHG | HEIGHT: 72 IN | SYSTOLIC BLOOD PRESSURE: 120 MMHG | WEIGHT: 248 LBS | BODY MASS INDEX: 33.59 KG/M2

## 2024-12-02 DIAGNOSIS — I47.29 NSVT (NONSUSTAINED VENTRICULAR TACHYCARDIA) (MULTI): ICD-10-CM

## 2024-12-02 DIAGNOSIS — I10 PRIMARY HYPERTENSION: Primary | ICD-10-CM

## 2024-12-02 LAB — BODY SURFACE AREA: 2.39 M2

## 2024-12-02 PROCEDURE — 3074F SYST BP LT 130 MM HG: CPT | Performed by: INTERNAL MEDICINE

## 2024-12-02 PROCEDURE — 3008F BODY MASS INDEX DOCD: CPT | Performed by: INTERNAL MEDICINE

## 2024-12-02 PROCEDURE — 99213 OFFICE O/P EST LOW 20 MIN: CPT | Performed by: INTERNAL MEDICINE

## 2024-12-02 PROCEDURE — 93246 EXT ECG>7D<15D RECORDING: CPT

## 2024-12-02 PROCEDURE — 93010 ELECTROCARDIOGRAM REPORT: CPT | Performed by: INTERNAL MEDICINE

## 2024-12-02 PROCEDURE — 93005 ELECTROCARDIOGRAM TRACING: CPT | Mod: 59 | Performed by: INTERNAL MEDICINE

## 2024-12-02 PROCEDURE — 1036F TOBACCO NON-USER: CPT | Performed by: INTERNAL MEDICINE

## 2024-12-02 PROCEDURE — 3080F DIAST BP >= 90 MM HG: CPT | Performed by: INTERNAL MEDICINE

## 2024-12-02 ASSESSMENT — ENCOUNTER SYMPTOMS
DEPRESSION: 0
LOSS OF SENSATION IN FEET: 0
OCCASIONAL FEELINGS OF UNSTEADINESS: 0
PALPITATIONS: 1

## 2024-12-02 NOTE — PATIENT INSTRUCTIONS
Continue all current medications as prescribed.  Dr. Perry has ordered a heart monitor to assess your heart rhythm.  Dr. Perry has also ordered an echocardiogram (ultrasound of the heart) to followup on your heart function and structure.  Dr. Perry has placed a referral to Dr. Martinez, electrical specialist of the heart.  Followup with Dr. Perry in January 2025.     If you have any questions or cardiac concerns, please call our office at [phone].

## 2024-12-02 NOTE — PROGRESS NOTES
Counseling:  The patient was counseled regarding diagnostic results, instructions for management, risk factor reductions, prognosis, patient and family education, impressions, risks and benefits of treatment options and importance of compliance with treatment.       Chief Complaint:   The patient presents today for annual followup of NSVT.     History Of Present Illness:    Bubba Garza is a 49 year old male patient who presents today for annual followup of NSVT. His PMH is significant for obesity, dyslipidemia, GERD, metabolic syndrome X, sleep apnea and NSVT. Over the past year, the patient states that he has done well from a cardiac standpoint. He denies any CP, chest discomfort or SOB. He reports frequent episodes of palpitations with increased heart rates. BP has been stable. The patient is compliant with his prescribed medications.      Last Recorded Vitals:  Vitals:    12/02/24 1359   BP: 120/90   BP Location: Left arm   Pulse: 80   Weight: 112 kg (248 lb)   Height: 1.829 m (6')       Past Surgical History:  He has a past surgical history that includes Other surgical history (07/29/2021).      Social History:  He reports that he has never smoked. He has never used smokeless tobacco. He reports that he does not currently use alcohol. He reports that he does not use drugs.    Family History:  Family History   Problem Relation Name Age of Onset    Diabetes Father      Sleep apnea Brother      Other (S/P CABG (CORONARY ARTERY BYPASS GRAFT)) Maternal Grandmother      Heart attack Maternal Grandfather          Allergies:  Patient has no known allergies.    Outpatient Medications:  Current Outpatient Medications   Medication Instructions    dilTIAZem CD (CARDIZEM CD) 240 mg, oral, Daily    famotidine (PEPCID) 10 mg, oral, Nightly    fluticasone (Flonase) 50 mcg/actuation nasal spray 2 sprays, Each Nostril, Daily, 1 HOUR BEFORE BEDTIME    hydrocortisone 2.5 % cream APPLY SPARINGLY TO AFFECTED AREA(S) TWICE DAILY FOR 14  DAYS    losartan-hydrochlorothiazide (Hyzaar) 100-25 mg tablet 1 tablet, oral, Daily    multivitamin tablet Multi Vitamin Daily Oral Tablet   Refills: 0       Active    rosuvastatin (CRESTOR) 10 mg, oral, Daily with evening meal     Review of Systems   Cardiovascular:  Positive for palpitations.   All other systems reviewed and are negative.     Physical Exam:  Constitutional:       Appearance: Healthy appearance. Not in distress.   Neck:      Vascular: No JVR. JVD normal.   Pulmonary:      Effort: Pulmonary effort is normal.      Breath sounds: Normal breath sounds. No wheezing. No rhonchi. No rales.   Chest:      Chest wall: Not tender to palpatation.   Cardiovascular:      PMI at left midclavicular line. Normal rate. Regular rhythm. Normal S1. Normal S2.       Murmurs: There is no murmur.      No gallop.  No click. No rub.   Pulses:     Intact distal pulses.   Edema:     Peripheral edema absent.   Abdominal:      General: Bowel sounds are normal.      Palpations: Abdomen is soft.      Tenderness: There is no abdominal tenderness.   Musculoskeletal: Normal range of motion.         General: No tenderness. Skin:     General: Skin is warm and dry.   Neurological:      General: No focal deficit present.      Mental Status: Alert and oriented to person, place and time.          Last Labs:  CBC -  Lab Results   Component Value Date    WBC 5.7 07/25/2024    HGB 15.2 07/25/2024    HCT 43.3 07/25/2024    MCV 85 07/25/2024     07/25/2024       CMP -  Lab Results   Component Value Date    CALCIUM 9.2 10/28/2024    PROT 7.1 10/28/2024    ALBUMIN 4.4 10/28/2024    AST 20 10/28/2024    ALT 33 10/28/2024    ALKPHOS 50 10/28/2024    BILITOT 0.7 10/28/2024       LIPID PANEL -   Lab Results   Component Value Date    CHOL 154 10/28/2024    TRIG 200 (H) 10/28/2024    HDL 29.3 10/28/2024    CHHDL 5.3 10/28/2024    LDLF 77 09/18/2023    VLDL 40 10/28/2024    NHDL 125 10/28/2024       RENAL FUNCTION PANEL -   Lab Results    Component Value Date    GLUCOSE 103 (H) 10/28/2024     10/28/2024    K 3.8 10/28/2024     10/28/2024    CO2 28 10/28/2024    ANIONGAP 11 10/28/2024    BUN 19 10/28/2024    CREATININE 1.04 10/28/2024    GFRMALE 81 09/18/2023    CALCIUM 9.2 10/28/2024    ALBUMIN 4.4 10/28/2024        Lab Results   Component Value Date    HGBA1C 5.5 09/18/2023       Last Cardiology Tests:  10/04/2021 - TTE  The left ventricular systolic function is normal with a 60-65% estimated ejection fraction.     09/09/2021 - Cardiac MRI  1. Normal LV size with normal systolic function (LVEF 58%) with no regional wall motion abnormalities  2. On late gadolinium imaging, there is myocardial enhancement at the right ventricular insertion points and the basal septum mid myocardium (non-ischemic). No evidence of prior infarction.  3. Dilated main pulmonary artery measuring up to 3.3 cm, clinically correlate for history of pulmonary hypertension.     07/06/2021 - Holter Monitoring (13d 4h)  1. Average heart rate was 82 bpm, minimum heart rate was 39 bpm on Day 2 / 03:41:59 am, max heart rate was 162 bpm on Day 14 / 01:33:00 pm.  2. Atrial fibrillation burden or flutter: [place] was 0%.  3. PSVC(s): [place] was 0.12%, max count per 24 hours 140.  4. SVT (AT,RT): 16 events, longest event 17 beats on Day 10 / 05:35:36 pm, fastest event 197 bpm on Day 7 / 06:01:59 pm.  5. Pause: 0 events.  6. AV Block: 05%.  7. PVC(s): [place] was 1.77%, max count per 24 hours 2103, 11 disparate morphologies.   8. Ventricular Tachycardia: 9 events, longest event 4 beats at Day 11 / 09:36:48 am, fastest rate 188 bpm at Day 2 / 11:41:13 pm.     07/20/2021 - Exercise Stress Echo  1. Frequent PVCs, Quad PVCs and couplets.  2. No clinical, electrocardiographic or echocardiographic evidence for ischemia at maximal workload.  3. The adequate level of stress was achieved.    Lab review: I have personally reviewed the laboratory result(s).    Assessment/Plan   1)  Symptomatic PVCs with Rhythm Strip in [place] Showing 2-3 beats of NSVT  On diltiazem 240 mg daily  Negative echo and stress test  Holter monitor with PVCs and NSVT  MRI of heart with basal nonischemic scar  EP input appreciated  Denies CP, chest discomfort or SOB  Reports frequent palpitations with increased HRs  Scheduled for tonsillectomy, uvulectomy and septoplasty   BP stable  Check echo  Check 14-day Holter  Referral placed to Dr. Martinez   F/U 01/2025    2) Sleep Apnea  Management per sleep medicine     3) Dyslipidemia  Management per PCP  On rosuvastatin 10 mg daily   Lipid panel 10/28/2024 with total cholesterol, LDL and triglycerides of 154, 85 and 200 respectively    4) HTN  Management per PCP  Stable   On losartan-HCTZ 100-25 mg daily      Scribe Attestation  By signing my name below, IOumou Scribe   attest that this documentation has been prepared under the direction and in the presence of Armin Perry MD.

## 2024-12-02 NOTE — PROGRESS NOTES
Luh TORRES placed the Holter on the patient today for 14 days     She when over what can and can not be done while wearing it   Patient showed understanding

## 2024-12-02 NOTE — LETTER
December 2, 2024     Andrey Antonio,   [address]  [facility] [address]    Patient: Bubba Garza   YOB: 1975   Date of Visit: 12/2/2024       Dear Dr. Andrey Antonio, :    Thank you for referring Bubba Garza to me for evaluation. Below are my notes for this consultation.  If you have questions, please do not hesitate to call me. I look forward to following your patient along with you.       Sincerely,     Armin Perry MD      CC: No Recipients  ______________________________________________________________________________________    Counseling:  The patient was counseled regarding diagnostic results, instructions for management, risk factor reductions, prognosis, patient and family education, impressions, risks and benefits of treatment options and importance of compliance with treatment.       Chief Complaint:   The patient presents today for annual followup of NSVT.     History Of Present Illness:    Bubba Garza is a 49 year old male patient who presents today for annual followup of NSVT. His PMH is significant for obesity, dyslipidemia, GERD, metabolic syndrome X, sleep apnea and NSVT. Over the past year, the patient states that he has done well from a cardiac standpoint. He denies any CP, chest discomfort or SOB. He reports frequent episodes of palpitations with increased heart rates. BP has been stable. The patient is compliant with his prescribed medications.      Last Recorded Vitals:  Vitals:    12/02/24 1359   BP: 120/90   BP Location: Left arm   Pulse: 80   Weight: 112 kg (248 lb)   Height: 1.829 m (6')       Past Surgical History:  He has a past surgical history that includes Other surgical history (07/29/2021).      Social History:  He reports that he has never smoked. He has never used smokeless tobacco. He reports that he does not currently use alcohol. He reports that he does not use drugs.    Family History:  Family History   Problem  Relation Name Age of Onset   • Diabetes Father     • Sleep apnea Brother     • Other (S/P CABG (CORONARY ARTERY BYPASS GRAFT)) Maternal Grandmother     • Heart attack Maternal Grandfather          Allergies:  Patient has no known allergies.    Outpatient Medications:  Current Outpatient Medications   Medication Instructions   • dilTIAZem CD (CARDIZEM CD) 240 mg, oral, Daily   • famotidine (PEPCID) 10 mg, oral, Nightly   • fluticasone (Flonase) 50 mcg/actuation nasal spray 2 sprays, Each Nostril, Daily, 1 HOUR BEFORE BEDTIME   • hydrocortisone 2.5 % cream APPLY SPARINGLY TO AFFECTED AREA(S) TWICE DAILY FOR 14 DAYS   • losartan-hydrochlorothiazide (Hyzaar) 100-25 mg tablet 1 tablet, oral, Daily   • multivitamin tablet Multi Vitamin Daily Oral Tablet   Refills: 0       Active   • rosuvastatin (CRESTOR) 10 mg, oral, Daily with evening meal     Review of Systems   Cardiovascular:  Positive for palpitations.   All other systems reviewed and are negative.     Physical Exam:  Constitutional:       Appearance: Healthy appearance. Not in distress.   Neck:      Vascular: No JVR. JVD normal.   Pulmonary:      Effort: Pulmonary effort is normal.      Breath sounds: Normal breath sounds. No wheezing. No rhonchi. No rales.   Chest:      Chest wall: Not tender to palpatation.   Cardiovascular:      PMI at left midclavicular line. Normal rate. Regular rhythm. Normal S1. Normal S2.       Murmurs: There is no murmur.      No gallop.  No click. No rub.   Pulses:     Intact distal pulses.   Edema:     Peripheral edema absent.   Abdominal:      General: Bowel sounds are normal.      Palpations: Abdomen is soft.      Tenderness: There is no abdominal tenderness.   Musculoskeletal: Normal range of motion.         General: No tenderness. Skin:     General: Skin is warm and dry.   Neurological:      General: No focal deficit present.      Mental Status: Alert and oriented to person, place and time.          Last Labs:  CBC -  Lab Results    Component Value Date    WBC 5.7 07/25/2024    HGB 15.2 07/25/2024    HCT 43.3 07/25/2024    MCV 85 07/25/2024     07/25/2024       CMP -  Lab Results   Component Value Date    CALCIUM 9.2 10/28/2024    PROT 7.1 10/28/2024    ALBUMIN 4.4 10/28/2024    AST 20 10/28/2024    ALT 33 10/28/2024    ALKPHOS 50 10/28/2024    BILITOT 0.7 10/28/2024       LIPID PANEL -   Lab Results   Component Value Date    CHOL 154 10/28/2024    TRIG 200 (H) 10/28/2024    HDL 29.3 10/28/2024    CHHDL 5.3 10/28/2024    LDLF 77 09/18/2023    VLDL 40 10/28/2024    NHDL 125 10/28/2024       RENAL FUNCTION PANEL -   Lab Results   Component Value Date    GLUCOSE 103 (H) 10/28/2024     10/28/2024    K 3.8 10/28/2024     10/28/2024    CO2 28 10/28/2024    ANIONGAP 11 10/28/2024    BUN 19 10/28/2024    CREATININE 1.04 10/28/2024    GFRMALE 81 09/18/2023    CALCIUM 9.2 10/28/2024    ALBUMIN 4.4 10/28/2024        Lab Results   Component Value Date    HGBA1C 5.5 09/18/2023       Last Cardiology Tests:  10/04/2021 - TTE  The left ventricular systolic function is normal with a 60-65% estimated ejection fraction.     09/09/2021 - Cardiac MRI  1. Normal LV size with normal systolic function (LVEF 58%) with no regional wall motion abnormalities  2. On late gadolinium imaging, there is myocardial enhancement at the right ventricular insertion points and the basal septum mid myocardium (non-ischemic). No evidence of prior infarction.  3. Dilated main pulmonary artery measuring up to 3.3 cm, clinically correlate for history of pulmonary hypertension.     07/06/2021 - Holter Monitoring (13d 4h)  1. Average heart rate was 82 bpm, minimum heart rate was 39 bpm on Day 2 / 03:41:59 am, max heart rate was 162 bpm on Day 14 / 01:33:00 pm.  2. Atrial fibrillation burden or flutter: [place] was 0%.  3. PSVC(s): [place] was 0.12%, max count per 24 hours 140.  4. SVT (AT,RT): 16 events, longest event 17 beats on Day 10 / 05:35:36 pm, fastest event 197  bpm on Day 7 / 06:01:59 pm.  5. Pause: 0 events.  6. AV Block: 05%.  7. PVC(s): [place] was 1.77%, max count per 24 hours 2103, 11 disparate morphologies.   8. Ventricular Tachycardia: 9 events, longest event 4 beats at Day 11 / 09:36:48 am, fastest rate 188 bpm at Day 2 / 11:41:13 pm.     07/20/2021 - Exercise Stress Echo  1. Frequent PVCs, Quad PVCs and couplets.  2. No clinical, electrocardiographic or echocardiographic evidence for ischemia at maximal workload.  3. The adequate level of stress was achieved.    Lab review: I have personally reviewed the laboratory result(s).    Assessment/Plan  1) Symptomatic PVCs with Rhythm Strip in [place] Showing 2-3 beats of NSVT  On diltiazem 240 mg daily  Negative echo and stress test  Holter monitor with PVCs and NSVT  MRI of heart with basal nonischemic scar  EP input appreciated  Denies CP, chest discomfort or SOB  Reports frequent palpitations with increased HRs  Scheduled for tonsillectomy, uvulectomy and septoplasty   BP stable  Check echo  Check 14-day Holter  Referral placed to Dr. Martinez   F/U 01/2025    2) Sleep Apnea  Management per sleep medicine     3) Dyslipidemia  Management per PCP  On rosuvastatin 10 mg daily   Lipid panel 10/28/2024 with total cholesterol, LDL and triglycerides of 154, 85 and 200 respectively    4) HTN  Management per PCP  Stable   On losartan-HCTZ 100-25 mg daily      Scribe Attestation  By signing my name below, I, Scarlett Crespo   attest that this documentation has been prepared under the direction and in the presence of Armin Perry MD.

## 2024-12-03 ENCOUNTER — APPOINTMENT (OUTPATIENT)
Dept: CARDIOLOGY | Facility: CLINIC | Age: 49
End: 2024-12-03
Payer: COMMERCIAL

## 2024-12-05 ENCOUNTER — APPOINTMENT (OUTPATIENT)
Dept: CARDIOLOGY | Facility: HOSPITAL | Age: 49
End: 2024-12-05
Payer: COMMERCIAL

## 2024-12-06 ENCOUNTER — DOCUMENTATION (OUTPATIENT)
Dept: CARDIOLOGY | Facility: HOSPITAL | Age: 49
End: 2024-12-06
Payer: COMMERCIAL

## 2024-12-06 NOTE — PROGRESS NOTES
Request from [facility], Dave Weiner D.O. for NSR, Turbs, DISE on 24    Diagnosis/what are we seeing for:  NSVT     Last ischemic work up stress test  Date: 2021  Anticoagulation:  none  Antiplatelet:  none  Has patient had a recent cardioversion or ablation?  no     Last seen by  Armin Perry M.D.    Next appointment:  2025                         [address] [place] [address]                   Phone# [phone]              Fax# [phone]      Date: 24    RE: Bubba Garza            : 1975       Surgical/Procedural Clearance for:  ENT procedure  Patient is at: LOW cardiovascular risk for this LOW risk procedure.           N/A hold Antiplatelet      N/A hold Anticoagulant                    Is further cardiac workup is needed prior to the procedure?  No     Patient should continue Beta Blocker in the perioperative period.  Yes     Patient should resume antiplatelet/anticoagulation as soon as cleared by surgeon/procedure physician.  N/A       Thank You,    24 at 10:54 AM - Armin Perry MD

## 2024-12-18 PROCEDURE — 88304 TISSUE EXAM BY PATHOLOGIST: CPT | Performed by: PATHOLOGY

## 2024-12-18 PROCEDURE — 88304 TISSUE EXAM BY PATHOLOGIST: CPT

## 2024-12-19 ENCOUNTER — LAB REQUISITION (OUTPATIENT)
Dept: LAB | Facility: HOSPITAL | Age: 49
End: 2024-12-19
Payer: COMMERCIAL

## 2024-12-19 DIAGNOSIS — J34.3 HYPERTROPHY OF NASAL TURBINATES: ICD-10-CM

## 2024-12-27 LAB — BODY SURFACE AREA: 2.39 M2

## 2024-12-27 PROCEDURE — 93248 EXT ECG>7D<15D REV&INTERPJ: CPT | Performed by: INTERNAL MEDICINE

## 2024-12-30 ENCOUNTER — TELEPHONE (OUTPATIENT)
Dept: CARDIOLOGY | Facility: HOSPITAL | Age: 49
End: 2024-12-30
Payer: COMMERCIAL

## 2024-12-30 DIAGNOSIS — I47.29 NSVT (NONSUSTAINED VENTRICULAR TACHYCARDIA) (MULTI): Primary | ICD-10-CM

## 2024-12-30 LAB
LABORATORY COMMENT REPORT: NORMAL
PATH REPORT.FINAL DX SPEC: NORMAL
PATH REPORT.GROSS SPEC: NORMAL
PATH REPORT.TOTAL CANCER: NORMAL

## 2024-12-30 NOTE — TELEPHONE ENCOUNTER
RN called pt at this time with results and plan. RN placed ordered at this time CTA. Pt verbalized understanding at this time.         ----- Message from Armin Perry sent at 12/30/2024 10:00 AM EST -----  Regarding: RE: Isha Report  Get a CT angio of the coronaries before her appointment with me for NSVT  ----- Message -----  From: Krista Brewer RN  Sent: 12/27/2024   1:30 PM EST  To: Armin Perry MD  Subject: Zio Report                                       I got the Zio report back 295 VT runs and 15 runs of SVT.

## 2025-01-28 ENCOUNTER — HOSPITAL ENCOUNTER (OUTPATIENT)
Dept: RADIOLOGY | Facility: HOSPITAL | Age: 50
Discharge: HOME | End: 2025-01-28
Payer: COMMERCIAL

## 2025-01-28 VITALS
DIASTOLIC BLOOD PRESSURE: 65 MMHG | BODY MASS INDEX: 31.97 KG/M2 | HEART RATE: 82 BPM | WEIGHT: 236 LBS | OXYGEN SATURATION: 100 % | SYSTOLIC BLOOD PRESSURE: 139 MMHG | HEIGHT: 72 IN | RESPIRATION RATE: 18 BRPM | TEMPERATURE: 97.5 F

## 2025-01-28 DIAGNOSIS — I47.29 NSVT (NONSUSTAINED VENTRICULAR TACHYCARDIA) (MULTI): ICD-10-CM

## 2025-01-28 PROCEDURE — 2500000005 HC RX 250 GENERAL PHARMACY W/O HCPCS: Performed by: NURSE PRACTITIONER

## 2025-01-28 PROCEDURE — 2500000004 HC RX 250 GENERAL PHARMACY W/ HCPCS (ALT 636 FOR OP/ED): Performed by: NURSE PRACTITIONER

## 2025-01-28 RX ORDER — METOPROLOL TARTRATE 1 MG/ML
5 INJECTION, SOLUTION INTRAVENOUS ONCE AS NEEDED
Status: DISCONTINUED | OUTPATIENT
Start: 2025-01-28 | End: 2025-01-29 | Stop reason: HOSPADM

## 2025-01-28 RX ORDER — METOPROLOL TARTRATE 1 MG/ML
5 INJECTION, SOLUTION INTRAVENOUS ONCE AS NEEDED
Status: COMPLETED | OUTPATIENT
Start: 2025-01-28 | End: 2025-01-28

## 2025-01-28 RX ORDER — DILTIAZEM HCL/D5W 125 MG/125
5-15 PLASTIC BAG, INJECTION (ML) INTRAVENOUS CONTINUOUS
Status: DISCONTINUED | OUTPATIENT
Start: 2025-01-28 | End: 2025-01-29 | Stop reason: HOSPADM

## 2025-01-28 RX ORDER — NITROGLYCERIN 0.4 MG/1
0.4 TABLET SUBLINGUAL ONCE
Status: DISCONTINUED | OUTPATIENT
Start: 2025-01-28 | End: 2025-01-29 | Stop reason: HOSPADM

## 2025-01-28 RX ORDER — DILTIAZEM HYDROCHLORIDE 5 MG/ML
10 INJECTION INTRAVENOUS ONCE
Status: COMPLETED | OUTPATIENT
Start: 2025-01-28 | End: 2025-01-28

## 2025-01-28 RX ORDER — METOPROLOL TARTRATE 1 MG/ML
5 INJECTION, SOLUTION INTRAVENOUS ONCE
Status: COMPLETED | OUTPATIENT
Start: 2025-01-28 | End: 2025-01-28

## 2025-01-28 RX ORDER — LORAZEPAM 2 MG/ML
0.5 INJECTION INTRAMUSCULAR EVERY 5 MIN PRN
Status: DISCONTINUED | OUTPATIENT
Start: 2025-01-28 | End: 2025-01-29 | Stop reason: HOSPADM

## 2025-01-28 RX ADMIN — Medication 5 MG/HR: at 11:34

## 2025-01-28 RX ADMIN — METOPROLOL TARTRATE 5 MG: 5 INJECTION INTRAVENOUS at 12:34

## 2025-01-28 RX ADMIN — METOPROLOL TARTRATE 5 MG: 5 INJECTION INTRAVENOUS at 13:03

## 2025-01-28 RX ADMIN — DILTIAZEM HYDROCHLORIDE 10 MG: 5 INJECTION INTRAVENOUS at 11:33

## 2025-01-28 ASSESSMENT — PAIN SCALES - GENERAL
PAINLEVEL_OUTOF10: 0 - NO PAIN

## 2025-01-28 ASSESSMENT — PAIN - FUNCTIONAL ASSESSMENT: PAIN_FUNCTIONAL_ASSESSMENT: 0-10

## 2025-01-31 ENCOUNTER — TELEPHONE (OUTPATIENT)
Dept: CARDIOLOGY | Facility: HOSPITAL | Age: 50
End: 2025-01-31
Payer: COMMERCIAL

## 2025-01-31 DIAGNOSIS — I47.29 NSVT (NONSUSTAINED VENTRICULAR TACHYCARDIA) (MULTI): Primary | ICD-10-CM

## 2025-01-31 DIAGNOSIS — I25.10 CORONARY ARTERY DISEASE INVOLVING NATIVE CORONARY ARTERY, UNSPECIFIED WHETHER ANGINA PRESENT, UNSPECIFIED WHETHER NATIVE OR TRANSPLANTED HEART: Primary | ICD-10-CM

## 2025-01-31 NOTE — TELEPHONE ENCOUNTER
RN called pt at this time with results and plan. No answer at this time, RN left message for patient to call back.     RN called pt at this time with results and plan. Pt verbalized understanding.        ----- Message from Dinora Bernardino sent at 1/31/2025  9:41 AM EST -----  Regarding: Needs Set up for [facility] with Dr. Vicyk Velasco,     His coronary CTA was cancelled - he could not make HR and he was having frequent PVCs     Dr. Perry would like Heart cath r/o obstructive CAD on the patient. He found VT on the holter monitor.     I placed the case request.     He will need called to let him know that this is what Dr. Perry wants. If he needs me to call him, I can.     I also called Boy to get him set up with an appointment with EP.     -Agustina

## 2025-01-31 NOTE — TELEPHONE ENCOUNTER
Called and LVM for pt to schedule NPV with Dr. Stewart EP - Dr. Perry referring for NSVT/VT. Offered to schedule pt at [facility] as his home address is listed in [place] - may be closer for him than [place] and can be seen sooner if agreeable. Will wait to hear from pt.

## 2025-02-04 ENCOUNTER — TELEPHONE (OUTPATIENT)
Dept: CARDIOLOGY | Facility: HOSPITAL | Age: 50
End: 2025-02-04
Payer: COMMERCIAL

## 2025-02-04 NOTE — TELEPHONE ENCOUNTER
Patient called office asking if his orders for a heart cath have been placed. Patient wishes to be contacted as soon as orders are placed in order to schedule.

## 2025-02-05 ENCOUNTER — DOCUMENTATION (OUTPATIENT)
Dept: CARDIOLOGY | Facility: HOSPITAL | Age: 50
End: 2025-02-05
Payer: COMMERCIAL

## 2025-02-05 ENCOUNTER — TELEPHONE (OUTPATIENT)
Dept: CARDIOLOGY | Facility: HOSPITAL | Age: 50
End: 2025-02-05
Payer: COMMERCIAL

## 2025-02-05 NOTE — TELEPHONE ENCOUNTER
Dr. Perry Left Heart Cath 2/24/2025.     Arrival time is: 0630  Procedure time: 0730     Called cath instructions to patient including review of date and arrival time. Verified no need for dye prep.  Labs ordered please get them drawn. Nothing to eat or drink after midnight.  You will need a .  Bring your photo ID, insurance cards and either a current list home medications or the medications in original bottles.  Wear comfortable loose fitting clothing.  There is a possibility of staying over night for observation so pack a bag with necessities.  Patient verbalized understanding and is agreeable to the plan.     Patient stated he was planning on going to [place] in March and was concerned about having the cath prior to his trip. Requesting cath after March 8.

## 2025-02-05 NOTE — TELEPHONE ENCOUNTER
Regarding patient scheduled vacation in March:     Per Dr Perry he would recommend keeping cath as scheduled. Per Dr Perry, no flying restrictions post procedure. Patient verbalized understanding there may be variables including an admission for observation if needed.  Discussed with patient and he will move forward with cath as scheduled.

## 2025-02-05 NOTE — TELEPHONE ENCOUNTER
----- Message from Nurse Brigette ALLISON sent at 2/5/2025  3:23 PM EST -----  Regarding: FW: ##AG [facility] 2/24/2025 PRE CATH INSTRUCTIONS##    ----- Message -----  From: Janay Boone  Sent: 2/5/2025   1:43 PM EST  To: Mónica Yu RN; Korina Joseph; #  Subject: ##AG [facility] 2/24/2025 PRE CATH INSTRUCTIONS##       Hello,     Patient is scheduled with Dr. Perry for their Left Heart Cath on 2/24/2025. Procedure time is 7:30 am and their arrival time is 6:30 am.     He has [employer] as his insurance.    Can you please call and give this patient their pre cath instructions.    This patient has asked that if his cath gets approved sooner if we are able to move him up.    Thank you!  Ravi WHITAKER

## 2025-02-08 LAB
ANION GAP SERPL CALCULATED.4IONS-SCNC: 8 MMOL/L (CALC) (ref 7–17)
BUN SERPL-MCNC: 16 MG/DL (ref 7–25)
BUN/CREAT SERPL: ABNORMAL (CALC) (ref 6–22)
CALCIUM SERPL-MCNC: 10 MG/DL (ref 8.6–10.3)
CHLORIDE SERPL-SCNC: 103 MMOL/L (ref 98–110)
CO2 SERPL-SCNC: 29 MMOL/L (ref 20–32)
CREAT SERPL-MCNC: 1.27 MG/DL (ref 0.7–1.3)
EGFRCR SERPLBLD CKD-EPI 2021: 68 ML/MIN/1.73M2
ERYTHROCYTE [DISTWIDTH] IN BLOOD BY AUTOMATED COUNT: 13.2 % (ref 11–15)
GLUCOSE SERPL-MCNC: 108 MG/DL (ref 65–99)
HCT VFR BLD AUTO: 45.4 % (ref 38.5–50)
HGB BLD-MCNC: 14.8 G/DL (ref 13.2–17.1)
MCH RBC QN AUTO: 29.2 PG (ref 27–33)
MCHC RBC AUTO-ENTMCNC: 32.6 G/DL (ref 32–36)
MCV RBC AUTO: 89.5 FL (ref 80–100)
PLATELET # BLD AUTO: 287 THOUSAND/UL (ref 140–400)
PMV BLD REES-ECKER: 11.4 FL (ref 7.5–12.5)
POTASSIUM SERPL-SCNC: 3.6 MMOL/L (ref 3.5–5.3)
RBC # BLD AUTO: 5.07 MILLION/UL (ref 4.2–5.8)
SODIUM SERPL-SCNC: 140 MMOL/L (ref 135–146)
WBC # BLD AUTO: 7.4 THOUSAND/UL (ref 3.8–10.8)

## 2025-02-10 ENCOUNTER — HOSPITAL ENCOUNTER (OUTPATIENT)
Dept: CARDIOLOGY | Facility: HOSPITAL | Age: 50
Discharge: HOME | End: 2025-02-10
Payer: COMMERCIAL

## 2025-02-10 DIAGNOSIS — I47.29 NSVT (NONSUSTAINED VENTRICULAR TACHYCARDIA) (MULTI): ICD-10-CM

## 2025-02-10 DIAGNOSIS — I10 PRIMARY HYPERTENSION: ICD-10-CM

## 2025-02-10 PROCEDURE — 93306 TTE W/DOPPLER COMPLETE: CPT | Performed by: INTERNAL MEDICINE

## 2025-02-10 PROCEDURE — 93306 TTE W/DOPPLER COMPLETE: CPT

## 2025-02-11 LAB
AORTIC VALVE MEAN GRADIENT: 4 MMHG
AORTIC VALVE PEAK VELOCITY: 1.31 M/S
AV PEAK GRADIENT: 7 MMHG
AVA (PEAK VEL): 2.76 CM2
AVA (VTI): 2.65 CM2
EJECTION FRACTION APICAL 4 CHAMBER: 58.3
EJECTION FRACTION: 58 %
LEFT ATRIUM VOLUME AREA LENGTH INDEX BSA: 21.6 ML/M2
LEFT VENTRICLE INTERNAL DIMENSION DIASTOLE: 4.41 CM (ref 3.5–6)
LEFT VENTRICULAR OUTFLOW TRACT DIAMETER: 2.09 CM
LV EJECTION FRACTION BIPLANE: 58 %
MITRAL VALVE E/A RATIO: 1.04
MITRAL VALVE E/E' RATIO: 7.01
RIGHT VENTRICLE FREE WALL PEAK S': 13.5 CM/S
TRICUSPID ANNULAR PLANE SYSTOLIC EXCURSION: 2.5 CM

## 2025-02-20 ENCOUNTER — APPOINTMENT (OUTPATIENT)
Dept: CARDIOLOGY | Facility: HOSPITAL | Age: 50
End: 2025-02-20
Payer: COMMERCIAL

## 2025-02-20 ENCOUNTER — APPOINTMENT (OUTPATIENT)
Dept: CARDIOLOGY | Facility: CLINIC | Age: 50
End: 2025-02-20
Payer: COMMERCIAL

## 2025-02-20 VITALS
WEIGHT: 240 LBS | HEIGHT: 74 IN | BODY MASS INDEX: 30.8 KG/M2 | SYSTOLIC BLOOD PRESSURE: 137 MMHG | HEART RATE: 87 BPM | DIASTOLIC BLOOD PRESSURE: 86 MMHG

## 2025-02-20 DIAGNOSIS — I47.29 NSVT (NONSUSTAINED VENTRICULAR TACHYCARDIA) (MULTI): ICD-10-CM

## 2025-02-20 NOTE — PROGRESS NOTES
Subjective:  The patient is a 49-year-old white male, followed primarily by Dr. Andrey Antonio and from a cardiology standpoint by Dr. Armin Perry, who presents for evaluation because of frequent ventricular ectopy.  He has a history of hypertension, obstructive sleep apnea, hyperlipidemia, gastroesophageal reflux disease, and obesity.  He did not tolerate CPAP masks well, and recently underwent uvuloplasty and tonsillectomy as well as nasal sinus surgery, all in an effort to improve his nighttime breathing.    From a cardiac standpoint, the patient has had occasional premature ventricular complexes in the past and up to 4 beats of nonsustained ventricular tachycardia.  A workup in 2021 showed normal left ventricular function, but some late gadolinium enhancement in the basal septal area by cardiac MR imaging, suspicious for prior scarring, perhaps from subclinical myocarditis.  The patient was apparently seen electrophysiologist Dr. Jose Manuel Angulo, who felt that PVC ablation would be challenging due to the relatively low burden of PVCs and the multiple morphologies.  The patient has been treated with Cardizem  mg daily for what are thought to be triggered PVCs.  He underwent a recent event monitor, showing a PVC burden up to 6.7%, but still with no more than 4 successive beats of ventricular origin.  The patient has never suffered syncope or a cardiac arrest.    The patient is  and lives in [place].  He works as a  in [place].  His immediate superior suffered a recent cardiac arrest at age 44 and was successfully resuscitated and had an ICD implanted at [facility], all of which has made the patient more acutely aware of his cardiac condition.  The patient is now taking his pulse frequently and is aware that he is having extrasystoles.    The patient is scheduled for coronary angiography next week by Dr. Armin Perry, to assure that there are no high-grade lesions  "contributing to the recent increase in PVC frequency.    Current Outpatient Medications   Medication Sig    dilTIAZem CD (Cardizem CD) 240 mg 24 hr capsule Take 1 capsule (240 mg) by mouth once daily.    famotidine (Pepcid) 10 mg tablet Take 1 tablet (10 mg) by mouth once daily at bedtime.    losartan-hydrochlorothiazide (Hyzaar) 100-25 mg tablet Take 1 tablet by mouth once daily.    multivitamin tablet Multi Vitamin Daily Oral Tablet    rosuvastatin (Crestor) 10 mg tablet Take 1 tablet (10 mg) by mouth once daily in the evening. Take with meals.     Allergies:  Patient has no known allergies.     Patient Active Problem List   Diagnosis    Dyslipidemia, goal LDL below 100    Gastroesophageal reflux disease without esophagitis    Hypovitaminosis D    Metabolic syndrome X    NSVT (nonsustained ventricular tachycardia) (Multi)    LESLY on CPAP    PVCs (premature ventricular contractions)    Personal history of colonic polyps    Hemorrhoids    Diverticulosis    Class 1 obesity due to excess calories with serious comorbidity and body mass index (BMI) of 33.0 to 33.9 in adult    Plantar fasciitis, left    Primary hypertension    ACE-inhibitor cough    Flu vaccine need     Past Surgical History:   Procedure Laterality Date    OTHER SURGICAL HISTORY  07/29/2021    Hemorrhoidectomy     Objective:  Vitals:    02/20/25 1010   BP: 137/86   Pulse: 87   Height:     1.88 m (6' 2\")  Weight: 109 kg (240 lb)     Exam:  Gen: Pleasant stocky gentleman in no distress.  HEENT: Shaved head.  Neck: No jugular venous distention or thyromegaly.  Lungs: Clear to auscultation, with no wheezes or rales.  Heart: Regular rhythm with frequent extrasystoles and occasional bigeminal rhythm, but no murmurs.  Abdomen: Benign, with no organomegaly or masses.  Extremities: Intact distal pulses; no edema.  Neuro: No focal neurologic abnormalities.  Skin: No cutaneous lesions.    EKG: An EKG today shows prominent sinus arrhythmia from 60 to 100 bpm with 3 " PVCs on the strip, all with different morphologies.    Impressions:  1.  Chronic hypertension, the borderline control on Cardizem CD and Hyzaar therapy.  2.  Obesity with obstructive sleep apnea, status post recent uvuloplasty.  3.  Frequent ventricular ectopy of multiple morphologies, with no evidence of PVC-induced cardiomyopathy.  The patient had some probable basal scarring by cardiac MR imaging in 2021, suggesting possible old subclinical myocarditis as the source of his ectopy.  There is no indication that he is at high risk of syncope or sudden death, however.  4.  Hyperlipidemia, on statin therapy.  5.  Questionable occult coronary disease, to undergo coronary angiography on 2/24/2025 for definitive evaluation.  6.  Gastroesophageal reflux disease by history.    Recommendations:  1.  The patient will undergo his cardiac catheterization next week as scheduled.  2.  If he has even mild coronary disease, I recommend switching his Cardizem CD over to a beta-blocker such as Toprol-XL or Bystolic, as his PVC's may be better suppressed with beta-blockers then with calcium channel blockers.  3.  I also spoke with the patient about possible insertable loop recorder placement to document the worst of his ventricular arrhythmias, to help decide if more aggressive therapies may be needed in the future (e.g. potent antiarrhythmic therapy or ICD consideration).  4.  The patient will see me at the discretion of Dr. Perry.      Ephraim Stewart MD

## 2025-02-20 NOTE — H&P (VIEW-ONLY)
Subjective:  The patient is a 49-year-old white male, followed primarily by Dr. Andrey Antonio and from a cardiology standpoint by Dr. Armin Perry, who presents for evaluation because of frequent ventricular ectopy.  He has a history of hypertension, obstructive sleep apnea, hyperlipidemia, gastroesophageal reflux disease, and obesity.  He did not tolerate CPAP masks well, and recently underwent uvuloplasty and tonsillectomy as well as nasal sinus surgery, all in an effort to improve his nighttime breathing.    From a cardiac standpoint, the patient has had occasional premature ventricular complexes in the past and up to 4 beats of nonsustained ventricular tachycardia.  A workup in 2021 showed normal left ventricular function, but some late gadolinium enhancement in the basal septal area by cardiac MR imaging, suspicious for prior scarring, perhaps from subclinical myocarditis.  The patient was apparently seen electrophysiologist Dr. Jose Manuel Angulo, who felt that PVC ablation would be challenging due to the relatively low burden of PVCs and the multiple morphologies.  The patient has been treated with Cardizem  mg daily for what are thought to be triggered PVCs.  He underwent a recent event monitor, showing a PVC burden up to 6.7%, but still with no more than 4 successive beats of ventricular origin.  The patient has never suffered syncope or a cardiac arrest.    The patient is  and lives in [place].  He works as a  in [place].  His immediate superior suffered a recent cardiac arrest at age 42 and was successfully resuscitated and had an ICD implanted at [facility], all of which has made the patient more acutely aware of his cardiac condition.  The patient is now taking his pulse frequently and is aware that he is having extrasystoles.    The patient is scheduled for coronary angiography next week by Dr. Armin Perry, to assure that there are no high-grade lesions  "contributing to the recent increase in PVC frequency.    Current Outpatient Medications   Medication Sig    dilTIAZem CD (Cardizem CD) 240 mg 24 hr capsule Take 1 capsule (240 mg) by mouth once daily.    famotidine (Pepcid) 10 mg tablet Take 1 tablet (10 mg) by mouth once daily at bedtime.    losartan-hydrochlorothiazide (Hyzaar) 100-25 mg tablet Take 1 tablet by mouth once daily.    multivitamin tablet Multi Vitamin Daily Oral Tablet    rosuvastatin (Crestor) 10 mg tablet Take 1 tablet (10 mg) by mouth once daily in the evening. Take with meals.     Allergies:  Patient has no known allergies.     Patient Active Problem List   Diagnosis    Dyslipidemia, goal LDL below 100    Gastroesophageal reflux disease without esophagitis    Hypovitaminosis D    Metabolic syndrome X    NSVT (nonsustained ventricular tachycardia) (Multi)    LESLY on CPAP    PVCs (premature ventricular contractions)    Personal history of colonic polyps    Hemorrhoids    Diverticulosis    Class 1 obesity due to excess calories with serious comorbidity and body mass index (BMI) of 33.0 to 33.9 in adult    Plantar fasciitis, left    Primary hypertension    ACE-inhibitor cough    Flu vaccine need     Past Surgical History:   Procedure Laterality Date    OTHER SURGICAL HISTORY  07/29/2021    Hemorrhoidectomy     Objective:  Vitals:    02/20/25 1010   BP: 137/86   Pulse: 87   Height:     1.88 m (6' 2\")  Weight: 109 kg (240 lb)     Exam:  Gen: Pleasant stocky gentleman in no distress.  HEENT: Shaved head.  Neck: No jugular venous distention or thyromegaly.  Lungs: Clear to auscultation, with no wheezes or rales.  Heart: Regular rhythm with frequent extrasystoles and occasional bigeminal rhythm, but no murmurs.  Abdomen: Benign, with no organomegaly or masses.  Extremities: Intact distal pulses; no edema.  Neuro: No focal neurologic abnormalities.  Skin: No cutaneous lesions.    EKG: An EKG today shows prominent sinus arrhythmia from 60 to 100 bpm with 3 " PVCs on the strip, all with different morphologies.    Impressions:  1.  Chronic hypertension, the borderline control on Cardizem CD and Hyzaar therapy.  2.  Obesity with obstructive sleep apnea, status post recent uvuloplasty.  3.  Frequent ventricular ectopy of multiple morphologies, with no evidence of PVC-induced cardiomyopathy.  The patient had some probable basal scarring by cardiac MR imaging in 2021, suggesting possible old subclinical myocarditis as the source of his ectopy.  There is no indication that he is at high risk of syncope or sudden death, however.  4.  Hyperlipidemia, on statin therapy.  5.  Questionable occult coronary disease, to undergo coronary angiography on 2/24/2025 for definitive evaluation.  6.  Gastroesophageal reflux disease by history.    Recommendations:  1.  The patient will undergo his cardiac catheterization next week as scheduled.  2.  If he has even mild coronary disease, I recommend switching his Cardizem CD over to a beta-blocker such as Toprol-XL or Bystolic, as his PVC's may be better suppressed with beta-blockers then with calcium channel blockers.  3.  I also spoke with the patient about possible insertable loop recorder placement to document the worst of his ventricular arrhythmias, to help decide if more aggressive therapies may be needed in the future (e.g. potent antiarrhythmic therapy or ICD consideration).  4.  The patient will see me at the discretion of Dr. Perry.      Ephraim Stewart MD

## 2025-02-24 ENCOUNTER — HOSPITAL ENCOUNTER (OUTPATIENT)
Facility: HOSPITAL | Age: 50
Setting detail: OUTPATIENT SURGERY
Discharge: HOME | End: 2025-02-24
Attending: INTERNAL MEDICINE | Admitting: INTERNAL MEDICINE
Payer: COMMERCIAL

## 2025-02-24 ENCOUNTER — PHARMACY VISIT (OUTPATIENT)
Dept: PHARMACY | Facility: CLINIC | Age: 50
End: 2025-02-24
Payer: COMMERCIAL

## 2025-02-24 VITALS
RESPIRATION RATE: 17 BRPM | HEIGHT: 72 IN | HEART RATE: 94 BPM | DIASTOLIC BLOOD PRESSURE: 76 MMHG | BODY MASS INDEX: 32.51 KG/M2 | SYSTOLIC BLOOD PRESSURE: 140 MMHG | TEMPERATURE: 97.8 F | OXYGEN SATURATION: 100 % | WEIGHT: 240 LBS

## 2025-02-24 DIAGNOSIS — I47.20 VENTRICULAR TACHYCARDIA, UNSPECIFIED (MULTI): ICD-10-CM

## 2025-02-24 DIAGNOSIS — I47.29 NSVT (NONSUSTAINED VENTRICULAR TACHYCARDIA) (MULTI): Primary | ICD-10-CM

## 2025-02-24 PROCEDURE — C1760 CLOSURE DEV, VASC: HCPCS | Performed by: INTERNAL MEDICINE

## 2025-02-24 PROCEDURE — 99152 MOD SED SAME PHYS/QHP 5/>YRS: CPT | Performed by: INTERNAL MEDICINE

## 2025-02-24 PROCEDURE — 93458 L HRT ARTERY/VENTRICLE ANGIO: CPT | Performed by: INTERNAL MEDICINE

## 2025-02-24 PROCEDURE — 2500000004 HC RX 250 GENERAL PHARMACY W/ HCPCS (ALT 636 FOR OP/ED): Performed by: INTERNAL MEDICINE

## 2025-02-24 PROCEDURE — 96373 THER/PROPH/DIAG INJ IA: CPT | Mod: 59 | Performed by: INTERNAL MEDICINE

## 2025-02-24 PROCEDURE — RXMED WILLOW AMBULATORY MEDICATION CHARGE

## 2025-02-24 PROCEDURE — 2500000004 HC RX 250 GENERAL PHARMACY W/ HCPCS (ALT 636 FOR OP/ED): Performed by: NURSE PRACTITIONER

## 2025-02-24 PROCEDURE — 2720000007 HC OR 272 NO HCPCS: Performed by: INTERNAL MEDICINE

## 2025-02-24 PROCEDURE — C1894 INTRO/SHEATH, NON-LASER: HCPCS | Performed by: INTERNAL MEDICINE

## 2025-02-24 PROCEDURE — C1887 CATHETER, GUIDING: HCPCS | Performed by: INTERNAL MEDICINE

## 2025-02-24 PROCEDURE — 7100000010 HC PHASE TWO TIME - EACH INCREMENTAL 1 MINUTE: Performed by: INTERNAL MEDICINE

## 2025-02-24 PROCEDURE — 7100000009 HC PHASE TWO TIME - INITIAL BASE CHARGE: Performed by: INTERNAL MEDICINE

## 2025-02-24 PROCEDURE — 2550000001 HC RX 255 CONTRASTS: Performed by: INTERNAL MEDICINE

## 2025-02-24 RX ORDER — MIDAZOLAM HYDROCHLORIDE 1 MG/ML
INJECTION, SOLUTION INTRAMUSCULAR; INTRAVENOUS AS NEEDED
Status: DISCONTINUED | OUTPATIENT
Start: 2025-02-24 | End: 2025-02-24 | Stop reason: HOSPADM

## 2025-02-24 RX ORDER — ACETAMINOPHEN 160 MG/5ML
650 SOLUTION ORAL EVERY 6 HOURS PRN
Status: DISCONTINUED | OUTPATIENT
Start: 2025-02-24 | End: 2025-02-24 | Stop reason: HOSPADM

## 2025-02-24 RX ORDER — SODIUM CHLORIDE 9 MG/ML
1000 INJECTION, SOLUTION INTRAVENOUS ONCE AS NEEDED
Status: DISCONTINUED | OUTPATIENT
Start: 2025-02-24 | End: 2025-02-24 | Stop reason: HOSPADM

## 2025-02-24 RX ORDER — ACETAMINOPHEN 650 MG/1
650 SUPPOSITORY RECTAL EVERY 6 HOURS PRN
Status: DISCONTINUED | OUTPATIENT
Start: 2025-02-24 | End: 2025-02-24 | Stop reason: HOSPADM

## 2025-02-24 RX ORDER — LIDOCAINE HYDROCHLORIDE 20 MG/ML
INJECTION, SOLUTION INFILTRATION; PERINEURAL AS NEEDED
Status: DISCONTINUED | OUTPATIENT
Start: 2025-02-24 | End: 2025-02-24 | Stop reason: HOSPADM

## 2025-02-24 RX ORDER — ACETAMINOPHEN 325 MG/1
650 TABLET ORAL EVERY 6 HOURS PRN
Status: DISCONTINUED | OUTPATIENT
Start: 2025-02-24 | End: 2025-02-24 | Stop reason: HOSPADM

## 2025-02-24 RX ORDER — HEPARIN SODIUM 1000 [USP'U]/ML
INJECTION, SOLUTION INTRAVENOUS; SUBCUTANEOUS AS NEEDED
Status: DISCONTINUED | OUTPATIENT
Start: 2025-02-24 | End: 2025-02-24 | Stop reason: HOSPADM

## 2025-02-24 RX ORDER — METOPROLOL SUCCINATE 50 MG/1
50 TABLET, EXTENDED RELEASE ORAL DAILY
Qty: 90 TABLET | Refills: 3 | Status: SHIPPED | OUTPATIENT
Start: 2025-02-24 | End: 2026-02-24

## 2025-02-24 RX ORDER — MORPHINE SULFATE 2 MG/ML
2 INJECTION, SOLUTION INTRAMUSCULAR; INTRAVENOUS EVERY 6 HOURS PRN
Status: DISCONTINUED | OUTPATIENT
Start: 2025-02-24 | End: 2025-02-24 | Stop reason: HOSPADM

## 2025-02-24 RX ORDER — FENTANYL CITRATE 50 UG/ML
INJECTION, SOLUTION INTRAMUSCULAR; INTRAVENOUS AS NEEDED
Status: DISCONTINUED | OUTPATIENT
Start: 2025-02-24 | End: 2025-02-24 | Stop reason: HOSPADM

## 2025-02-24 RX ORDER — SODIUM CHLORIDE 9 MG/ML
100 INJECTION, SOLUTION INTRAVENOUS CONTINUOUS
Status: DISCONTINUED | OUTPATIENT
Start: 2025-02-24 | End: 2025-02-24

## 2025-02-24 RX ORDER — SODIUM CHLORIDE 9 MG/ML
75 INJECTION, SOLUTION INTRAVENOUS CONTINUOUS
Status: DISCONTINUED | OUTPATIENT
Start: 2025-02-24 | End: 2025-02-24 | Stop reason: HOSPADM

## 2025-02-24 RX ADMIN — SODIUM CHLORIDE 100 ML/HR: 9 INJECTION, SOLUTION INTRAVENOUS at 07:10

## 2025-02-24 ASSESSMENT — COLUMBIA-SUICIDE SEVERITY RATING SCALE - C-SSRS
1. IN THE PAST MONTH, HAVE YOU WISHED YOU WERE DEAD OR WISHED YOU COULD GO TO SLEEP AND NOT WAKE UP?: NO
2. HAVE YOU ACTUALLY HAD ANY THOUGHTS OF KILLING YOURSELF?: NO
6. HAVE YOU EVER DONE ANYTHING, STARTED TO DO ANYTHING, OR PREPARED TO DO ANYTHING TO END YOUR LIFE?: NO

## 2025-02-24 ASSESSMENT — PAIN - FUNCTIONAL ASSESSMENT
PAIN_FUNCTIONAL_ASSESSMENT: 0-10

## 2025-02-24 ASSESSMENT — PAIN SCALES - GENERAL
PAINLEVEL_OUTOF10: 0 - NO PAIN

## 2025-02-24 NOTE — DISCHARGE INSTRUCTIONS
If you have any questions, please call the Cath Lab Nurse Practitioner Agustina Bernardino at [phone] and leave a message. She will return your call the same day if calling before 3 PM M-F. If you call on the weekend you can expect a call back on Monday morning. You may also call the Cath Lab at [phone] M-F, 7-3:30 with any questions. Weekends and after hours please call your cardiologist office number to reach a physician on call.          CARDIAC CATHETERIZATION DISCHARGE INSTRUCTIONS     FOR SUDDEN AND SEVERE CHEST PAIN, SHORTNESS OF BREATH, EXCESSIVE BLEEDING, SIGNS OF STROKE, OR CHANGES IN MENTAL STATUS YOU SHOULD CALL 911 IMMEDIATELY.     If your provider has prescribed aspirin and/or clopidogrel (Plavix), or prasugrel (Effient), or ticagrelor (Brilinta), DO NOT STOP THESE MEDICATIONS for any reason without talking to your cardiologist first. If any of these were prescribed, you must take them every day without missing a single dose. If you are getting low on these medications, contact your provider immediately for a refill.     FOR NEXT 24 HOURS  - Upon discharge, you should return home and rest for the remainder of the day and evening. You do not have to stay on bed rest but should not be very active.  It is recommended a responsible adult be with you for the first 24 hours after the procedure.    - No driving for 24 hours after procedure. Please arrange for someone to drive you home from the hospital today.     - Do not drive, operate machinery, or use power tools for 24 hours after your procedure.     - Do not make any legal decisions for 24 hours after your procedure.     - Do not drink alcoholic beverages for 24 hours after your procedure.    WOUND CARE   *FOR FEMORAL (LEG) ACCESS*  ·      Avoid heavy lifting (over 10 pounds) for 7 days, squatting or excessive bending for 2 days, and strenuous exercise for 7 days.  ·      No submerged bathing, swimming, or hot tubs for the next 7 days, or until fully  healed.  ·      Avoid sexual activity for 3-4 days until any groin discomfort has ceased.     *FOR RADIAL (WRIST) ACCESS*  ·      No lifting more than 5 pounds or excessive use of the wrist for 24 hours - for example, treat your wrist as if it is sprained.  ·      Do not engage in vigorous activities (tennis, golf, bowling, weights) for at least 48 hours after the procedure.  ·      Do not submerge the wrist for 7 days after the procedure.  ·      You should expect mild tingling in your hand and tenderness at the puncture site for up to 3 days.    - The transparent dressing should be removed from the site 24 hours after the procedure.  Wash the site gently with soap and water. Rinse well and pat dry. Keep the area clean and dry. You may apply a Band-Aid to the site. Avoid lotions, ointments, or powders until fully healed.     - You may shower the day after your procedure.      - It is normal to notice a small bruise around the puncture site and/or a small grape sized or smaller lump. Any large bruising or large lump warrants a call to the office.     - If bleeding should occur, lay down and apply pressure to the affected area for 10 minutes.  If the bleeding stops notify your physician.  If there is a large amount of bleeding or spurting of blood CALL 911 immediately.  DO NOT drive yourself to the hospital.    - You may experience some tenderness, bruising or minimal inflammation.  If you have any concerns, you may contact the Cath Lab or if any of these symptoms become excessive, contact your cardiologist or go to the emergency room.     OTHER INSTRUCTIONS  - You may take acetaminophen (Tylenol) as directed for discomfort.  If pain is not relieved with acetaminophen (Tylenol), contact your doctor.    - If you notice or experience any of the following, you should notify your doctor or seek medical attention  Chest pain or discomfort  Change in mental status or weakness in extremities.  Dizziness, light headedness,  or feeling faint.  Change in the site where the procedure was performed, such as bleeding or an increased area of bruising or swelling.  Tingling, numbness, pain, or coolness in the leg/arm beyond the site where the procedure was performed.  Signs of infection (i.e. shaking chills, temperature > 100 degrees Fahrenheit, warmth, redness) in the leg/arm area where the procedure was performed.  Changes in urination   Bloody or black stools  Vomiting blood  Severe nose bleeds  Any excessive bleeding    - If you DO NOT have an appointment with your cardiologist within 2-4 weeks following your procedure, please contact their office.      Lifestyle Recommendations for a Healthier Life:    The following lifestyle changes can have a significant positive impact on your overall health - helping you to achieve healthy weight, lower metabolic and heart disease risk and manage stress more effectively:      1. Eat whole, fresh foods. Food is one of the biggest drivers of our overall health.  Make your meals whole foods based, focusing on a wide variety of non starchy vegetables and fruits.  It also beneficial to include various nuts, seeds and high-quality animal proteins for overall balanced diet.    2. Remove the sweeteners from your diet.  Artificial sweeteners have a negative impact on our metabolic health - they can cause increase in both glucose and insulin, increasing the risk or potential for insulin resistance and abnormal blood sugar levels.  Artificial sweeteners are also excessively sweeter than regular sugar, they trick our taste buds into craving extreme forms of sweet, like an addiction.  I encourage you to avoid sugar, but also stevia, aspartame, sucralose, sugar alcohols like xylitol and maltitol.    3. Get rid of the inflammatory factors in your diet - this mainly comes from sugars of all kinds and refined vegetable oils.  These can increase our risk for changes in our metabolic health which can lead to diabetes,  heart disease and other chronic disease.  You can reverse or combat the effective of inflammatory foods by increasing your intake of anti-inflammatory foods like wild-caught fish, fresh ground flax seed, fish oil and variety of fruits & vegetables.      4. Eat plenty of fiber!!  The standard American diet has very low levels of daily dietary fiber, many people barely get 15 grams per day.  There is plenty of nutrition research that shows how high-fiber foods can help lower our blood sugar.   Eat a wide variety of fiber-rich plant-based foods including nuts, seeds, fruits, vegetables, and legumes.    5. Get enough sleep. Studies from experts in endocrinology & metabolism have shown that even a few nights of poor sleep can increase the risk of insulin resistance and abnormal blood sugar values. Make sleep a priority - it is an important factor in your health.  Develop a sleep routine including: avoid eating two-three hours before bed, practice relaxation techniques (warm bath, meditation, yoga, mindfulness) to help relax your muscles and prepare you for sleep.    Go to bed and wake up at consistent times.  Avoid screen time for at least 60-90 minutes before bedtime.    6. Make exercise part of your regular routine.  Exercise helps us manage blood sugar more effectively and makes our cells more receptive to the effect of the insulin our body makes (lowers blood sugar).  Regular aerobic exercise AND interval or strength training are ideal to help maintain a healthy weight, metabolism & lower the risk of chronic disease.      7. Control stress levels. Chronic stress can lead to elevated blood sugar, elevated blood pressure, accumulation of fat around the belly and these things increase the risk for metabolic syndrome, diabetes and heart disease.  We all have various levels of stress in our lives, we can't control all of it, but we can control how we respond to it and manage it's impact.  Find healthy outlets for stress  management like meditation, yoga, deep breathing, or exercise.    Home BP monitoring instructions:   Goal < 130 / 80   Remain still, Avoid smoking, caffeinated beverages, or exercise within 30 min before BP measurements.  Ensure 5 min of quiet rest before BP measurements.  Sit correctly with back straight and supported (on a straight-backed dining chair, for example, rather than a sofa).  Sit with feet flat on the floor and legs uncrossed.  Keep arm supported on a flat surface (such as a table), with the upper arm at heart level.  Bottom of the cuff should be placed directly above the bend of the elbow  Take a reading in the AM before breakfast 2-3 times / week   Record all readings accurately:  A written log should be brought to all appointments   Monitors with built-in memory should be brought to all clinic appointments and calibrated to our machines      Weight Management:  Since food equals calories, in order to lose weight you must either eat fewer calories, exercise more to burn off calories with activity, or both. Food that is not used to fuel the body is stored as fat.    A major component of losing weight is to make smarter food choices. Here's how:    Limit non-nutritious foods, such as:  Sugar, honey, syrups and candy  Pastries, donuts, pies, cakes and cookies  Soft drinks, sweetened juices and alcoholic beverages    Cut down on high-fat foods by:  Choosing poultry, fish or lean red meat  Choosing low-fat cooking methods, such as baking, broiling, steaming, grilling and boiling  Using low-fat or non-fat dairy products  Using vinaigrette, herbs, lemon or fat-free salad dressings  Avoiding fatty meats, such as crook, sausage, cadet, ribs and luncheon meats  Avoiding high-fat snacks like nuts, chips and chocolate  Avoiding fried foods  Using less butter, margarine, oil and mayonnaise  Avoiding high-fat gravies, cream sauces and cream-based soups    Eat a variety of foods, including:  Fruit and vegetables  that are raw, steamed or baked  Whole grains, breads, cereal, rice and pasta  Dairy products, such as low-fat or non-fat milk or yogurt, low-fat cottage cheese and low-fat cheese  Protein-rich foods like chicken, turkey, fish, lean meat and legumes, or beans    Change your eating habits:  Eat three balanced meals a day to help control your hunger  Watch portion sizes and eat small servings of a variety of foods  Choose low-calorie snacks  Eat only when you are hungry and stop when you are satisfied  Eat slowly and try not to perform other tasks while eating  Find other activities to distract you from food, such as walking, taking up a hobby or being involved in the community  Include regular exercise in your daily routine  Find a support group, if necessary, for emotional support in your weight loss effort    Patient Education: Smoking Cessation  Making the commitment to quit smoking is one of the best choices that smokers can make for themselves, but also a difficult one. There are various opportunities for support available. Here is an overview of them.  There are various forms of nicotine replacement therapy available to assist with minimizing these symptoms. They are nicotine gum, nicotine patch, nicotine nasal spray, nicotine inhaler, and nicotine lozenge.  Which kind of nicotine replacement therapy will best work for you can be discussed with your doctor or nurse to help you understand the pros and cons of each.  Non-nicotine replacement therapy is also available. Bupropion (Wellbutrin, Zyban) is a mild anti-depressant that is also effective in helping people to quit smoking. It can be used alone or with nicotine replacement therapy.   Varenicline (Chantix) is another medication that helps people who what to quit. This medication is not a form of nicotine replacement therapy, but it helps with the withdrawal symptoms.  Studies have shown that the best success rates for people trying to quit include counseling  and/or support groups such as the [employer] that is a free, confidential, 24/7, 365-day-a-year treatment referral and information service:  5-324-883-HELP (4217)    Some helpful hints include:  Avoidance. Stay away from smokers and places where you are tempted to smoke.  Activities. Exercise or do hobbies that keep your hands busy.  Alternatives. Use oral substitutes such as sugarless gum, hard candy, and carrot sticks.  Change of habits. For example, if you usually smoke during a coffee break, then go for a walk instead.  Deep breathing. When you have the urge to smoke, do a deep breathing exercise and remind yourself why you quit.  Delay tactic. If you feel that you are about to light up, delay. Tell yourself you must wait 10 minutes. Often this simple trick will allow you to move beyond the urge  Discussion. Call a friend for support.  Drink of water. Use as an oral substitute such as water.  Many people say the reason they smoke is to deal with stress. Unfortunately, stress is a part of all our lives. When quitting, you will need to find new strategies to deal with stress. There are stress-management classes, and self-help books that can help you discover new ways to reduce and deal with stress.  The bottom line is: don't just try to go it alone-reach out for support to help you achieve your goal.

## 2025-02-24 NOTE — Clinical Note
Patient Clipped and Prepped: right groin and right radial. Prepped with ChloraPrep, a minimum of 3 minute dry time, longer if needed, no pooling noted, patient draped in sterile fashion. BY MARY

## 2025-02-24 NOTE — POST-PROCEDURE NOTE
Physician Transition of Care Summary  Invasive Cardiovascular Lab    Procedure Date: 2/24/2025  Attending:    Malika Perry - Primary  Resident/Fellow/Other Assistant: Surgeons and Role:  * No surgeons found with a matching role *    Indications:   Pre-op Diagnosis      * NSVT (nonsustained ventricular tachycardia) (Multi) [I47.29]    Post-procedure diagnosis:   Post-op Diagnosis     * NSVT (nonsustained ventricular tachycardia) (Multi) [I47.29]    Procedure(s):   Left Heart Cath, With LV  73301 - OR CATH PLMT L HRT & ARTS W/NJX & ANGIO IMG S&I    Procedure Findings:   No evidence of coronary artery disease angiographically   Please see full cath report for details     Description of the Procedure:   LHC   RRA>TR band     Complications:   None     Stents/Implants:   NA      Anticoagulation/Antiplatelet Plan:   NA    Estimated Blood Loss:   5 mL    Anesthesia: Moderate Sedation Anesthesia Staff: No anesthesia staff entered.    Any Specimen(s) Removed:   No specimens collected during this procedure.    Disposition:   Recovery and home today     Recs:   NSVT   Discontinue Cardizem and start Toprol XL 50 mg daily       Electronically signed by: KIRBY Celeste, 2/24/2025 9:50 AM

## 2025-02-24 NOTE — Clinical Note
Closure device placed in the right radial artery. Site closed by radial compression system. 13 AIR  APPLIED BY MARY

## 2025-02-27 NOTE — PROGRESS NOTES
Counseling:  The patient was counseled regarding diagnostic results, instructions for management, risk factor reductions, prognosis, patient and family education, impressions, risks and benefits of treatment options and importance of compliance with treatment.       Chief Complaint:   The patient presents today for 3-month followup of palpitations and tachycardia s/p Holter monitor, echocardiogram and LHC.     History Of Present Illness:    Bubba Garza is a 49 year old male patient who presents today for 3-month followup of palpitations and tachycardia s/p Holter monitor, echocardiogram and LHC. His PMH is significant for obesity, dyslipidemia, GERD, metabolic syndrome X, sleep apnea and NSVT. Holter monitoring performed from 12/02/2024 to 12/16/2024 revealed 295 runs of VT and 15 runs of SVT. Based on these findings, the patient was referred to EP and cardiac catheterization was ordered. Echocardiogram performed 02/10/2025 demonstrated an EF of 58%. The patient was seen by Dr. Stewart, EP, on 02/20/2025, who recommended that if his LHC showed even mild CAD to switch Cardizem to a beta-blocker such as metoprolol or Bystolic as these medications would better suppress his PVCs. Dr. Stewart also discussed a loop recorder to help decide if more aggressive therapies may be needed in the future. On 02/24/2025, the patient was taken to the cath lab, which revealed mild non-obstructive CAD. Today, the patient states that he is feeling well, having recovered from LHC. He is tolerating the metoprolol well. BP has been stable.    Last Recorded Vitals:  Vitals:    02/28/25 1256   BP: 138/70   BP Location: Right arm   Pulse: 91   Weight: 110 kg (242 lb)   Height: 1.829 m (6')       Past Surgical History:  He has a past surgical history that includes Other surgical history (07/29/2021) and Cardiac catheterization (N/A, 2/24/2025).      Social History:  He reports that he has never smoked. He has never used smokeless tobacco.  He reports current alcohol use. He reports that he does not use drugs.    Family History:  Family History   Problem Relation Name Age of Onset    Diabetes Father      Sleep apnea Brother      Other (S/P CABG (CORONARY ARTERY BYPASS GRAFT)) Maternal Grandmother      Heart attack Maternal Grandfather          Allergies:  Patient has no known allergies.    Outpatient Medications:  Current Outpatient Medications   Medication Instructions    famotidine (PEPCID) 10 mg, oral, Nightly    losartan-hydrochlorothiazide (Hyzaar) 100-25 mg tablet 1 tablet, oral, Daily    metoprolol succinate XL (TOPROL-XL) 50 mg, oral, Daily, Do not crush or chew.    multivitamin tablet Multi Vitamin Daily Oral Tablet   Refills: 0       Active    rosuvastatin (CRESTOR) 10 mg, oral, Daily with evening meal     Review of Systems   All other systems reviewed and are negative.     Physical Exam:  Constitutional:       Appearance: Healthy appearance. Not in distress.   Neck:      Vascular: No JVR. JVD normal.   Pulmonary:      Effort: Pulmonary effort is normal.      Breath sounds: Normal breath sounds. No wheezing. No rhonchi. No rales.   Chest:      Chest wall: Not tender to palpatation.   Cardiovascular:      PMI at left midclavicular line. Normal rate. Regular rhythm. Normal S1. Normal S2.       Murmurs: There is no murmur.      No gallop.  No click. No rub.   Pulses:     Intact distal pulses.   Edema:     Peripheral edema absent.   Abdominal:      General: Bowel sounds are normal.      Palpations: Abdomen is soft.      Tenderness: There is no abdominal tenderness.   Musculoskeletal: Normal range of motion.         General: No tenderness. Skin:     General: Skin is warm and dry.   Neurological:      General: No focal deficit present.      Mental Status: Alert and oriented to person, place and time.          Last Labs:  CBC -  Lab Results   Component Value Date    WBC 7.4 02/07/2025    HGB 14.8 02/07/2025    HCT 45.4 02/07/2025    MCV 89.5  02/07/2025     02/07/2025       CMP -  Lab Results   Component Value Date    CALCIUM 10.0 02/07/2025    PROT 7.1 10/28/2024    ALBUMIN 4.4 10/28/2024    AST 20 10/28/2024    ALT 33 10/28/2024    ALKPHOS 50 10/28/2024    BILITOT 0.7 10/28/2024       LIPID PANEL -   Lab Results   Component Value Date    CHOL 154 10/28/2024    TRIG 200 (H) 10/28/2024    HDL 29.3 10/28/2024    CHHDL 5.3 10/28/2024    LDLF 77 09/18/2023    VLDL 40 10/28/2024    NHDL 125 10/28/2024       RENAL FUNCTION PANEL -   Lab Results   Component Value Date    GLUCOSE 108 (H) 02/07/2025     02/07/2025    K 3.6 02/07/2025     02/07/2025    CO2 29 02/07/2025    ANIONGAP 8 02/07/2025    BUN 16 02/07/2025    CREATININE 1.27 02/07/2025    GFRMALE 81 09/18/2023    CALCIUM 10.0 02/07/2025    ALBUMIN 4.4 10/28/2024        Lab Results   Component Value Date    HGBA1C 5.5 09/18/2023       Last Cardiology Tests:  02/24/2025 - Cardiac Catheterization (LH)  1. Mild non-obstructive coronary artery disease.  2. Left Ventricular end-diastolic pressure = 15.  3. Normal LV filling pressures.    02/10/2025 - TTE  Left ventricular ejection fraction is normal, calculated by Persaud's biplane at 58%.     12/02/2024 to 12/16/2024 - Holter Monitor  1. Predominant underlying rhythm was sinus rhythm; min HR 42 bpm, max  bpm, avg HR 82 bpm.  2. 295 ventricular tachycardia runs occurred; fastest interval lasting 4 beats with max rate 108 bpm, longest lasting 4 beats with avg rate 108 bpm. Ventricular tachycardia was detected within +/- 45 seconds of symptomatic patient event(s).  3. 15 supraventricular tachycardia runs occurred; fastest interval lasting 5 beats with max rate 190 bpm, longest lasting 8 beats with avg rate 141 bpm.  4. Isolated SVEs were rare, SVE couplets were rare, and SVE triplets were rare.  5. Isolated VEs were frequent, VE couplets were occasional, and VE triplets were rare.   6. Ventricular bigeminy and trigeminy were  present.    10/04/2021 - TTE  The left ventricular systolic function is normal with a 60-65% estimated ejection fraction.     09/09/2021 - Cardiac MRI  1. Normal LV size with normal systolic function (LVEF 58%) with no regional wall motion abnormalities  2. On late gadolinium imaging, there is myocardial enhancement at the right ventricular insertion points and the basal septum mid myocardium (non-ischemic). No evidence of prior infarction.  3. Dilated main pulmonary artery measuring up to 3.3 cm, clinically correlate for history of pulmonary hypertension.     07/06/2021 - Holter Monitoring (13d 4h)  1. Average heart rate was 82 bpm, minimum heart rate was 39 bpm on Day 2 / 03:41:59 am, max heart rate was 162 bpm on Day 14 / 01:33:00 pm.  2. Atrial fibrillation burden or flutter: [facility] was 0%.  3. PSVC(s): [facility] was 0.12%, max count per 24 hours 140.  4. SVT (AT,RT): 16 events, longest event 17 beats on Day 10 / 05:35:36 pm, fastest event 197 bpm on Day 7 / 06:01:59 pm.  5. Pause: 0 events.  6. AV Block: 05%.  7. PVC(s): [facility] was 1.77%, max count per 24 hours 2103, 11 disparate morphologies.   8. Ventricular Tachycardia: 9 events, longest event 4 beats at Day 11 / 09:36:48 am, fastest rate 188 bpm at Day 2 / 11:41:13 pm.     07/20/2021 - Exercise Stress Echo  1. Frequent PVCs, Quad PVCs and couplets.  2. No clinical, electrocardiographic or echocardiographic evidence for ischemia at maximal workload.  3. The adequate level of stress was achieved.    Lab review: I have personally reviewed the laboratory result(s).  Diagnostic review: I have personally reviewed the result(s) of the Echocardiogram, Holter Monitor, and [facility].     Assessment/Plan   1) Symptomatic PVCs with Rhythm Strip in [employer] [place] Showing 2-3 beats of NSVT  On metoprolol succinate 50 mg daily   Negative echo and stress test  Holter monitor with PVCs and NSVT  MRI of heart with basal nonischemic scar  Holter monitor 12/02/2024 to 12/16/2024 with 295  runs of VT, 15 runs of SVT  Subsequently referred to EP and C ordered   TTE 02/10/2025 with LVEF 58%  Seen by Dr. Stewart 02/20/2025 - recommended switching Cardizem to beta-blocker such as metoprolol or Bystolic for better suppression of PVCs, discussed loop recorder to help decide if more aggressive therapies may be needed in the future.   [facility] 02/24/2025 with mild non-obstructive CAD  Diltiazem subsequently switched to metoprolol succinate as per EP recommendations   Tolerating metoprolol well  Continue current medical Rx  Check Holter in April 2025 to assess PVC burden   F/U May 2025    2) Mild CAD  On losartan-HCTZ 100-25 mg daily, rosuvastatin 10 mg daily, metoprolol succinate 50 mg daily   Holter monitor 12/02/2024 to 12/16/2024 with 295 runs of VT, 15 runs of SVT  Subsequently referred to EP and C ordered   TTE 02/10/2025 with LVEF 58%  [facility] 02/24/2025 with mild non-obstructive CAD  Recovered well  Aggressive secondary risk factor modification   Continue current medical Rx     3) Sleep Apnea  Management per sleep medicine     4) Dyslipidemia  Management per PCP  On rosuvastatin 10 mg daily   Lipid panel 10/28/2024 with total cholesterol, LDL and triglycerides of 154, 85 and 200 respectively  Continue current medical Rx     5) HTN  Management per PCP  Stable   On losartan-HCTZ 100-25 mg daily,  metoprolol succinate 50 mg daily  Continue current medical Rx       Scribe Attestation  By signing my name below, I, Scarlett Crespo   attest that this documentation has been prepared under the direction and in the presence of Armin Perry MD.

## 2025-02-28 ENCOUNTER — OFFICE VISIT (OUTPATIENT)
Dept: CARDIOLOGY | Facility: HOSPITAL | Age: 50
End: 2025-02-28
Payer: COMMERCIAL

## 2025-02-28 VITALS
WEIGHT: 242 LBS | HEIGHT: 72 IN | DIASTOLIC BLOOD PRESSURE: 70 MMHG | HEART RATE: 91 BPM | BODY MASS INDEX: 32.78 KG/M2 | SYSTOLIC BLOOD PRESSURE: 138 MMHG

## 2025-02-28 DIAGNOSIS — I47.29 NSVT (NONSUSTAINED VENTRICULAR TACHYCARDIA) (MULTI): ICD-10-CM

## 2025-02-28 PROCEDURE — 3075F SYST BP GE 130 - 139MM HG: CPT | Performed by: INTERNAL MEDICINE

## 2025-02-28 PROCEDURE — 1036F TOBACCO NON-USER: CPT | Performed by: INTERNAL MEDICINE

## 2025-02-28 PROCEDURE — 99212 OFFICE O/P EST SF 10 MIN: CPT | Performed by: INTERNAL MEDICINE

## 2025-02-28 PROCEDURE — 3078F DIAST BP <80 MM HG: CPT | Performed by: INTERNAL MEDICINE

## 2025-02-28 PROCEDURE — 3008F BODY MASS INDEX DOCD: CPT | Performed by: INTERNAL MEDICINE

## 2025-02-28 ASSESSMENT — ENCOUNTER SYMPTOMS
LOSS OF SENSATION IN FEET: 0
DEPRESSION: 0
OCCASIONAL FEELINGS OF UNSTEADINESS: 0

## 2025-02-28 NOTE — LETTER
February 28, 2025     Andrey Antonio DO  [address]  [place] [address]    Patient: Bubba Garza   YOB: 1975   Date of Visit: 2/28/2025       Dear Dr. Andrey Antonio DO:    Thank you for referring Bubba Garza to me for evaluation. Below are my notes for this consultation.  If you have questions, please do not hesitate to call me. I look forward to following your patient along with you.       Sincerely,     Armin Perry MD      CC: No Recipients  ______________________________________________________________________________________    Counseling:  The patient was counseled regarding diagnostic results, instructions for management, risk factor reductions, prognosis, patient and family education, impressions, risks and benefits of treatment options and importance of compliance with treatment.       Chief Complaint:   The patient presents today for 3-month followup of palpitations and tachycardia s/p Holter monitor, echocardiogram and LHC.     History Of Present Illness:    Bubba Garza is a 49 year old male patient who presents today for 3-month followup of palpitations and tachycardia s/p Holter monitor, echocardiogram and LHC. His PMH is significant for obesity, dyslipidemia, GERD, metabolic syndrome X, sleep apnea and NSVT. Holter monitoring performed from 12/02/2024 to 12/16/2024 revealed 295 runs of VT and 15 runs of SVT. Based on these findings, the patient was referred to EP and cardiac catheterization was ordered. Echocardiogram performed 02/10/2025 demonstrated an EF of 58%. The patient was seen by Dr. Stewart, EP, on 02/20/2025, who recommended that if his LHC showed even mild CAD to switch Cardizem to a beta-blocker such as metoprolol or Bystolic as these medications would better suppress his PVCs. Dr. Stewart also discussed a loop recorder to help decide if more aggressive therapies may be needed in the future. On 02/24/2025, the patient  was taken to the cath lab, which revealed mild non-obstructive CAD. Today, the patient states that he is feeling well, having recovered from [facility]. He is tolerating the metoprolol well. BP has been stable.    Last Recorded Vitals:  Vitals:    02/28/25 1256   BP: 138/70   BP Location: Right arm   Pulse: 91   Weight: 110 kg (242 lb)   Height: 1.829 m (6')       Past Surgical History:  He has a past surgical history that includes Other surgical history (07/29/2021) and Cardiac catheterization (N/A, 2/24/2025).      Social History:  He reports that he has never smoked. He has never used smokeless tobacco. He reports current alcohol use. He reports that he does not use drugs.    Family History:  Family History   Problem Relation Name Age of Onset   • Diabetes Father     • Sleep apnea Brother     • Other (S/P CABG (CORONARY ARTERY BYPASS GRAFT)) Maternal Grandmother     • Heart attack Maternal Grandfather          Allergies:  Patient has no known allergies.    Outpatient Medications:  Current Outpatient Medications   Medication Instructions   • famotidine (PEPCID) 10 mg, oral, Nightly   • losartan-hydrochlorothiazide (Hyzaar) 100-25 mg tablet 1 tablet, oral, Daily   • metoprolol succinate XL (TOPROL-XL) 50 mg, oral, Daily, Do not crush or chew.   • multivitamin tablet Multi Vitamin Daily Oral Tablet   Refills: 0       Active   • rosuvastatin (CRESTOR) 10 mg, oral, Daily with evening meal     Review of Systems   All other systems reviewed and are negative.     Physical Exam:  Constitutional:       Appearance: Healthy appearance. Not in distress.   Neck:      Vascular: No JVR. JVD normal.   Pulmonary:      Effort: Pulmonary effort is normal.      Breath sounds: Normal breath sounds. No wheezing. No rhonchi. No rales.   Chest:      Chest wall: Not tender to palpatation.   Cardiovascular:      PMI at left midclavicular line. Normal rate. Regular rhythm. Normal S1. Normal S2.       Murmurs: There is no murmur.      No gallop.   No click. No rub.   Pulses:     Intact distal pulses.   Edema:     Peripheral edema absent.   Abdominal:      General: Bowel sounds are normal.      Palpations: Abdomen is soft.      Tenderness: There is no abdominal tenderness.   Musculoskeletal: Normal range of motion.         General: No tenderness. Skin:     General: Skin is warm and dry.   Neurological:      General: No focal deficit present.      Mental Status: Alert and oriented to person, place and time.          Last Labs:  CBC -  Lab Results   Component Value Date    WBC 7.4 02/07/2025    HGB 14.8 02/07/2025    HCT 45.4 02/07/2025    MCV 89.5 02/07/2025     02/07/2025       CMP -  Lab Results   Component Value Date    CALCIUM 10.0 02/07/2025    PROT 7.1 10/28/2024    ALBUMIN 4.4 10/28/2024    AST 20 10/28/2024    ALT 33 10/28/2024    ALKPHOS 50 10/28/2024    BILITOT 0.7 10/28/2024       LIPID PANEL -   Lab Results   Component Value Date    CHOL 154 10/28/2024    TRIG 200 (H) 10/28/2024    HDL 29.3 10/28/2024    CHHDL 5.3 10/28/2024    LDLF 77 09/18/2023    VLDL 40 10/28/2024    NHDL 125 10/28/2024       RENAL FUNCTION PANEL -   Lab Results   Component Value Date    GLUCOSE 108 (H) 02/07/2025     02/07/2025    K 3.6 02/07/2025     02/07/2025    CO2 29 02/07/2025    ANIONGAP 8 02/07/2025    BUN 16 02/07/2025    CREATININE 1.27 02/07/2025    GFRMALE 81 09/18/2023    CALCIUM 10.0 02/07/2025    ALBUMIN 4.4 10/28/2024        Lab Results   Component Value Date    HGBA1C 5.5 09/18/2023       Last Cardiology Tests:  02/24/2025 - Cardiac Catheterization (LH)  1. Mild non-obstructive coronary artery disease.  2. Left Ventricular end-diastolic pressure = 15.  3. Normal LV filling pressures.    02/10/2025 - TTE  Left ventricular ejection fraction is normal, calculated by Persaud's biplane at 58%.     12/02/2024 to 12/16/2024 - Holter Monitor  1. Predominant underlying rhythm was sinus rhythm; min HR 42 bpm, max  bpm, avg HR 82 bpm.  2. 295  ventricular tachycardia runs occurred; fastest interval lasting 4 beats with max rate 108 bpm, longest lasting 4 beats with avg rate 108 bpm. Ventricular tachycardia was detected within +/- 45 seconds of symptomatic patient event(s).  3. 15 supraventricular tachycardia runs occurred; fastest interval lasting 5 beats with max rate 190 bpm, longest lasting 8 beats with avg rate 141 bpm.  4. Isolated SVEs were rare, SVE couplets were rare, and SVE triplets were rare.  5. Isolated VEs were frequent, VE couplets were occasional, and VE triplets were rare.   6. Ventricular bigeminy and trigeminy were present.    10/04/2021 - TTE  The left ventricular systolic function is normal with a 60-65% estimated ejection fraction.     09/09/2021 - Cardiac MRI  1. Normal LV size with normal systolic function (LVEF 58%) with no regional wall motion abnormalities  2. On late gadolinium imaging, there is myocardial enhancement at the right ventricular insertion points and the basal septum mid myocardium (non-ischemic). No evidence of prior infarction.  3. Dilated main pulmonary artery measuring up to 3.3 cm, clinically correlate for history of pulmonary hypertension.     07/06/2021 - Holter Monitoring (13d 4h)  1. Average heart rate was 82 bpm, minimum heart rate was 39 bpm on Day 2 / 03:41:59 am, max heart rate was 162 bpm on Day 14 / 01:33:00 pm.  2. Atrial fibrillation burden or flutter: [place] was 0%.  3. PSVC(s): [place] was 0.12%, max count per 24 hours 140.  4. SVT (AT,RT): 16 events, longest event 17 beats on Day 10 / 05:35:36 pm, fastest event 197 bpm on Day 7 / 06:01:59 pm.  5. Pause: 0 events.  6. AV Block: 05%.  7. PVC(s): [place] was 1.77%, max count per 24 hours 2103, 11 disparate morphologies.   8. Ventricular Tachycardia: 9 events, longest event 4 beats at Day 11 / 09:36:48 am, fastest rate 188 bpm at Day 2 / 11:41:13 pm.     07/20/2021 - Exercise Stress Echo  1. Frequent PVCs, Quad PVCs and couplets.  2. No clinical,  electrocardiographic or echocardiographic evidence for ischemia at maximal workload.  3. The adequate level of stress was achieved.    Lab review: I have personally reviewed the laboratory result(s).  Diagnostic review: I have personally reviewed the result(s) of the Echocardiogram, Holter Monitor, and C.     Assessment/Plan  1) Symptomatic PVCs with Rhythm Strip in [employer] [place] Showing 2-3 beats of NSVT  On metoprolol succinate 50 mg daily   Negative echo and stress test  Holter monitor with PVCs and NSVT  MRI of heart with basal nonischemic scar  Holter monitor 12/02/2024 to 12/16/2024 with 295 runs of VT, 15 runs of SVT  Subsequently referred to EP and [facility] ordered   TTE 02/10/2025 with LVEF 58%  Seen by Dr. Stewart 02/20/2025 - recommended switching Cardizem to beta-blocker such as metoprolol or Bystolic for better suppression of PVCs, discussed loop recorder to help decide if more aggressive therapies may be needed in the future.   [facility] 02/24/2025 with mild non-obstructive CAD  Diltiazem subsequently switched to metoprolol succinate as per EP recommendations   Tolerating metoprolol well  Continue current medical Rx  Check Holter in April 2025 to assess PVC burden   F/U May 2025    2) Mild CAD  On losartan-HCTZ 100-25 mg daily, rosuvastatin 10 mg daily, metoprolol succinate 50 mg daily   Holter monitor 12/02/2024 to 12/16/2024 with 295 runs of VT, 15 runs of SVT  Subsequently referred to EP and [facility] ordered   TTE 02/10/2025 with LVEF 58%  [facility] 02/24/2025 with mild non-obstructive CAD  Recovered well  Aggressive secondary risk factor modification   Continue current medical Rx     3) Sleep Apnea  Management per sleep medicine     4) Dyslipidemia  Management per PCP  On rosuvastatin 10 mg daily   Lipid panel 10/28/2024 with total cholesterol, LDL and triglycerides of 154, 85 and 200 respectively  Continue current medical Rx     5) HTN  Management per PCP  Stable   On losartan-HCTZ 100-25 mg daily,  metoprolol  succinate 50 mg daily  Continue current medical Rx       Scribe Attestation  By signing my name below, I, Oumou Tomas Scrsumi   attest that this documentation has been prepared under the direction and in the presence of Armin Perry MD.

## 2025-02-28 NOTE — PATIENT INSTRUCTIONS
Continue all current medications as prescribed.   As your triglycerides are currently elevated, please monitor your diet more closely. Watch carbohydrate intake (rice, potatoes, pasta, bread, ice-cream all within moderation); increase greens, veggies, fiber, lean protein (fish, turkey, chicken; baked/boiled/grilled, not fried) and fruit.   Dr. Perry has ordered a heart monitor to be performed in April 2025 to evaluate your PVC burden while on metoprolol.   Followup with Dr. Perry in May 2025.     If you have any questions or cardiac concerns, please call our office at [phone].

## 2025-03-04 ENCOUNTER — TELEPHONE (OUTPATIENT)
Dept: CARDIOLOGY | Facility: HOSPITAL | Age: 50
End: 2025-03-04
Payer: COMMERCIAL

## 2025-03-04 NOTE — TELEPHONE ENCOUNTER
Called pt and LVM letting him know I scheduled him to come in and get holter monitor placed in April. I asked pt to give us a call back if the date and time does not work for him.    Jerica CRAWFORD

## 2025-03-20 ENCOUNTER — PHARMACY VISIT (OUTPATIENT)
Dept: PHARMACY | Facility: CLINIC | Age: 50
End: 2025-03-20
Payer: COMMERCIAL

## 2025-03-20 PROCEDURE — RXMED WILLOW AMBULATORY MEDICATION CHARGE

## 2025-04-07 ENCOUNTER — CLINICAL SUPPORT (OUTPATIENT)
Dept: CARDIOLOGY | Facility: HOSPITAL | Age: 50
End: 2025-04-07
Payer: COMMERCIAL

## 2025-04-07 DIAGNOSIS — I47.29 NSVT (NONSUSTAINED VENTRICULAR TACHYCARDIA) (MULTI): ICD-10-CM

## 2025-04-07 PROCEDURE — 93246 EXT ECG>7D<15D RECORDING: CPT | Performed by: INTERNAL MEDICINE

## 2025-04-07 NOTE — PROGRESS NOTES
Luh MENJIVAR placed holter monitor on pt on 04/07/25     Pt was explained on what they can and cannot do while wearing the monitor for 14 days.    Pt verbalized understanding.

## 2025-04-28 ENCOUNTER — ANCILLARY PROCEDURE (OUTPATIENT)
Dept: CARDIOLOGY | Facility: HOSPITAL | Age: 50
End: 2025-04-28
Payer: COMMERCIAL

## 2025-04-28 ENCOUNTER — TELEPHONE (OUTPATIENT)
Dept: CARDIOLOGY | Facility: HOSPITAL | Age: 50
End: 2025-04-28
Payer: COMMERCIAL

## 2025-04-28 DIAGNOSIS — I47.29 NSVT (NONSUSTAINED VENTRICULAR TACHYCARDIA) (MULTI): ICD-10-CM

## 2025-04-28 PROCEDURE — 93248 EXT ECG>7D<15D REV&INTERPJ: CPT | Performed by: STUDENT IN AN ORGANIZED HEALTH CARE EDUCATION/TRAINING PROGRAM

## 2025-04-28 PROCEDURE — 93246 EXT ECG>7D<15D RECORDING: CPT

## 2025-04-28 NOTE — TELEPHONE ENCOUNTER
LVM for pt to schedule follow up with Dr. Stewart per Dr. Perry's instruction. Pt previously saw Dr. Stewart at [facility] location - offered next avail 5/8 or 5/22 at [facility].

## 2025-05-06 ENCOUNTER — APPOINTMENT (OUTPATIENT)
Dept: PRIMARY CARE | Facility: CLINIC | Age: 50
End: 2025-05-06
Payer: COMMERCIAL

## 2025-05-07 ENCOUNTER — APPOINTMENT (OUTPATIENT)
Dept: CARDIOLOGY | Facility: HOSPITAL | Age: 50
End: 2025-05-07
Payer: COMMERCIAL

## 2025-05-28 NOTE — PROGRESS NOTES
Counseling:  The patient was counseled regarding diagnostic results, instructions for management, risk factor reductions, prognosis, patient and family education, impressions, risks and benefits of treatment options and importance of compliance with treatment.       Chief Complaint:   The patient presents today for 3-month followup of NSVT/SVT, PVCs, and CAD s/p Holter monitor.     History Of Present Illness:    Bubba Garza is a 50 year old male patient who presents today for 3-month followup of NSVT/SVT, PVCs, and CAD s/p Holter monitor. His PMH is significant for CAD, NSVT/SVT, PVCs, obesity, dyslipidemia, GERD, metabolic syndrome X, and sleep apnea. Holter monitoring performed from 04/07/2025 to 04/21/2025 revealed 412 runs of NSVT, 12 runs of SVT, rare isolated SVEs, frequent isolated VEs and VE couplets and occasional VE triplets. The patient is scheduled to see Dr. Stewart, EP, on 06/23/2025. Over the past 3 months, the patient states that he has done well from a cardiac standpoint. He denies any CP, chest discomfort or SOB. He reports postural lightheadedness only after bending over for extended periods of time. BP has been stable. The patient is compliant with his prescribed medications.     Last Recorded Vitals:  Vitals:    05/29/25 1044   BP: 120/80   BP Location: Left arm   Patient Position: Sitting   BP Cuff Size: Adult   Pulse: 73   SpO2: 96%   Weight: 111 kg (244 lb 12.8 oz)   Height: 1.829 m (6')       Past Surgical History:  He has a past surgical history that includes Other surgical history (07/29/2021) and Cardiac catheterization (N/A, 2/24/2025).      Social History:  He reports that he has never smoked. He has never used smokeless tobacco. He reports current alcohol use. He reports that he does not use drugs.    Family History:  Family History   Problem Relation Name Age of Onset    Diabetes Father      Sleep apnea Brother      Other (S/P CABG (CORONARY ARTERY BYPASS GRAFT)) Maternal Grandmother       Heart attack Maternal Grandfather          Allergies:  Patient has no known allergies.    Outpatient Medications:  Current Outpatient Medications   Medication Instructions    famotidine (PEPCID) 10 mg, oral, Nightly    losartan-hydrochlorothiazide (Hyzaar) 100-25 mg tablet 1 tablet, oral, Daily    metoprolol succinate XL (TOPROL-XL) 50 mg, oral, Daily, Do not crush or chew.    multivitamin tablet Multi Vitamin Daily Oral Tablet   Refills: 0       Active    rosuvastatin (CRESTOR) 10 mg, oral, Daily with evening meal     Review of Systems   Neurological:  Positive for light-headedness (postural).   All other systems reviewed and are negative.     Physical Exam:  Constitutional:       Appearance: Healthy appearance. Not in distress.   Neck:      Vascular: No JVR. JVD normal.   Pulmonary:      Effort: Pulmonary effort is normal.      Breath sounds: Normal breath sounds. No wheezing. No rhonchi. No rales.   Chest:      Chest wall: Not tender to palpatation.   Cardiovascular:      PMI at left midclavicular line. Normal rate. Regular rhythm. Normal S1. Normal S2.       Murmurs: There is no murmur.      No gallop.  No click. No rub.   Pulses:     Intact distal pulses.   Edema:     Peripheral edema absent.   Abdominal:      General: Bowel sounds are normal.      Palpations: Abdomen is soft.      Tenderness: There is no abdominal tenderness.   Musculoskeletal: Normal range of motion.         General: No tenderness. Skin:     General: Skin is warm and dry.   Neurological:      General: No focal deficit present.      Mental Status: Alert and oriented to person, place and time.          Last Labs:  CBC -  Lab Results   Component Value Date    WBC 7.4 02/07/2025    HGB 14.8 02/07/2025    HCT 45.4 02/07/2025    MCV 89.5 02/07/2025     02/07/2025       CMP -  Lab Results   Component Value Date    CALCIUM 10.0 02/07/2025    PROT 7.1 10/28/2024    ALBUMIN 4.4 10/28/2024    AST 20 10/28/2024    ALT 33 10/28/2024    ALKPHOS  50 10/28/2024    BILITOT 0.7 10/28/2024       LIPID PANEL -   Lab Results   Component Value Date    CHOL 154 10/28/2024    TRIG 200 (H) 10/28/2024    HDL 29.3 10/28/2024    CHHDL 5.3 10/28/2024    LDLF 77 09/18/2023    VLDL 40 10/28/2024    NHDL 125 10/28/2024       RENAL FUNCTION PANEL -   Lab Results   Component Value Date    GLUCOSE 108 (H) 02/07/2025     02/07/2025    K 3.6 02/07/2025     02/07/2025    CO2 29 02/07/2025    ANIONGAP 8 02/07/2025    BUN 16 02/07/2025    CREATININE 1.27 02/07/2025    GFRMALE 81 09/18/2023    CALCIUM 10.0 02/07/2025    ALBUMIN 4.4 10/28/2024        Lab Results   Component Value Date    HGBA1C 5.5 09/18/2023       Last Cardiology Tests:  04/07/2025 to 04/21/2025 - Holter Monitor  1. Predominant underlying rhythm was sinus rhythm; min HR 39 bpm, max  bpm, avg HR 79 bpm.  2. 412 ventricular tachycardia runs occurred; fastest interval lasting 4 beats with max rate 211 bpm, longest lasting 5 beats with avg rate 103 bpm.   3. 12 supraventricular tachycardia runs occurred; fastest interval lasting 11 beats with max rate 197 bpm, longest lasting 16 beats with avg rate 147 bpm.  4. Isolated SVEs were rare, and no SVE couplets or SVE triplets were present.  5. Isolated VEs were frequent, VE couplets were frequent, and VE triplets were occasional.     02/24/2025 - Cardiac Catheterization (LH)  1. Mild non-obstructive coronary artery disease.  2. Left Ventricular end-diastolic pressure = 15.  3. Normal LV filling pressures.    02/10/2025 - TTE  Left ventricular ejection fraction is normal, calculated by Persaud's biplane at 58%.     12/02/2024 to 12/16/2024 - Holter Monitor  1. Predominant underlying rhythm was sinus rhythm; min HR 42 bpm, max  bpm, avg HR 82 bpm.  2. 295 ventricular tachycardia runs occurred; fastest interval lasting 4 beats with max rate 108 bpm, longest lasting 4 beats with avg rate 108 bpm. Ventricular tachycardia was detected within +/- 45 seconds  of symptomatic patient event(s).  3. 15 supraventricular tachycardia runs occurred; fastest interval lasting 5 beats with max rate 190 bpm, longest lasting 8 beats with avg rate 141 bpm.  4. Isolated SVEs were rare, SVE couplets were rare, and SVE triplets were rare.  5. Isolated VEs were frequent, VE couplets were occasional, and VE triplets were rare.   6. Ventricular bigeminy and trigeminy were present.    10/04/2021 - TTE  The left ventricular systolic function is normal with a 60-65% estimated ejection fraction.     09/09/2021 - Cardiac MRI  1. Normal LV size with normal systolic function (LVEF 58%) with no regional wall motion abnormalities  2. On late gadolinium imaging, there is myocardial enhancement at the right ventricular insertion points and the basal septum mid myocardium (non-ischemic). No evidence of prior infarction.  3. Dilated main pulmonary artery measuring up to 3.3 cm, clinically correlate for history of pulmonary hypertension.     07/06/2021 - Holter Monitoring (13d 4h)  1. Average heart rate was 82 bpm, minimum heart rate was 39 bpm on Day 2 / 03:41:59 am, max heart rate was 162 bpm on Day 14 / 01:33:00 pm.  2. Atrial fibrillation burden or flutter: Whiting was 0%.  3. PSVC(s): Whiting was 0.12%, max count per 24 hours 140.  4. SVT (AT,RT): 16 events, longest event 17 beats on Day 10 / 05:35:36 pm, fastest event 197 bpm on Day 7 / 06:01:59 pm.  5. Pause: 0 events.  6. AV Block: 05%.  7. PVC(s): Whiting was 1.77%, max count per 24 hours 2103, 11 disparate morphologies.   8. Ventricular Tachycardia: 9 events, longest event 4 beats at Day 11 / 09:36:48 am, fastest rate 188 bpm at Day 2 / 11:41:13 pm.     07/20/2021 - Exercise Stress Echo  1. Frequent PVCs, Quad PVCs and couplets.  2. No clinical, electrocardiographic or echocardiographic evidence for ischemia at maximal workload.  3. The adequate level of stress was achieved.    Lab review: I have personally reviewed the laboratory  result(s).  Diagnostic review: I have personally reviewed the result(s) of the Holter Monitor.    Assessment/Plan   1) Symptomatic PVCs with Rhythm Strip in [employer] Showing 2-3 beats of NSVT  On metoprolol succinate 50 mg daily   Negative echo and stress test  Holter monitor with PVCs and NSVT  MRI of heart with basal nonischemic scar  Holter monitor 12/02/2024 to 12/16/2024 with 295 runs of VT, 15 runs of SVT  Subsequently referred to EP and [facility] ordered   TTE 02/10/2025 with LVEF 58%  Seen by Dr. Stewart 02/20/2025 - recommended switching Cardizem to beta-blocker such as metoprolol or Bystolic for better suppression of PVCs, discussed loop recorder to help decide if more aggressive therapies may be needed in the future.   [facility] 02/24/2025 with mild non-obstructive CAD  Holter 04/07/2025 to 04/21/2025 with 412 runs of NSVT, 12 runs of SVT, rare isolated SVEs, frequent isolated VEs and VE couplets, occasional VE triplets.    Reports postural lightheadedness only after bending over for extended periods of time  Attend apt with Dr. Stewart as scheduled 06/23/2025  Continue current medical Rx   Followup with Teresa Henderson NP, in 6 months    2) Mild CAD  On losartan-HCTZ 100-25 mg daily, rosuvastatin 10 mg daily, metoprolol succinate 50 mg daily   Holter monitor 12/02/2024 to 12/16/2024 with 295 runs of VT, 15 runs of SVT  Subsequently referred to EP and [facility] ordered   TTE 02/10/2025 with LVEF 58%  [facility] 02/24/2025 with mild non-obstructive CAD  Denies CP, chest discomfort or SOB  BP stable  Continue current medical Rx   Followup with Teresa Henderson NP, in 6 months    3) Sleep Apnea  Management per sleep medicine     4) Dyslipidemia  Management per PCP  On rosuvastatin 10 mg daily   Lipid panel 10/28/2024 with total cholesterol, LDL and triglycerides of 154, 85 and 200 respectively  Continue current medical Rx     5) HTN  Management per PCP  Stable   On losartan-HCTZ 100-25 mg daily, metoprolol succinate 50 mg  daily  Continue current medical Rx       Scribe Attestation  By signing my name below, I, Scarlett Crespo, attest that this documentation has been prepared under the direction and in the presence of Armin Perry MD.

## 2025-05-29 ENCOUNTER — OFFICE VISIT (OUTPATIENT)
Dept: CARDIOLOGY | Facility: HOSPITAL | Age: 50
End: 2025-05-29
Payer: COMMERCIAL

## 2025-05-29 VITALS
BODY MASS INDEX: 33.16 KG/M2 | WEIGHT: 244.8 LBS | SYSTOLIC BLOOD PRESSURE: 120 MMHG | HEIGHT: 72 IN | DIASTOLIC BLOOD PRESSURE: 80 MMHG | HEART RATE: 73 BPM | OXYGEN SATURATION: 96 %

## 2025-05-29 DIAGNOSIS — I47.29 NSVT (NONSUSTAINED VENTRICULAR TACHYCARDIA) (MULTI): ICD-10-CM

## 2025-05-29 PROCEDURE — 3008F BODY MASS INDEX DOCD: CPT | Performed by: INTERNAL MEDICINE

## 2025-05-29 PROCEDURE — 3074F SYST BP LT 130 MM HG: CPT | Performed by: INTERNAL MEDICINE

## 2025-05-29 PROCEDURE — 99213 OFFICE O/P EST LOW 20 MIN: CPT | Performed by: INTERNAL MEDICINE

## 2025-05-29 PROCEDURE — 3079F DIAST BP 80-89 MM HG: CPT | Performed by: INTERNAL MEDICINE

## 2025-05-29 PROCEDURE — 1036F TOBACCO NON-USER: CPT | Performed by: INTERNAL MEDICINE

## 2025-05-29 ASSESSMENT — ENCOUNTER SYMPTOMS: LIGHT-HEADEDNESS: 1

## 2025-05-29 NOTE — PATIENT INSTRUCTIONS
Continue all current medications as prescribed.  Attend your appointment with Dr. Stewart as scheduled on June 23, 2025.  Followup with Teresa Henderson NP, in 6 months.      If you have any questions or cardiac concerns, please call our office at [phone].

## 2025-06-04 DIAGNOSIS — E78.5 DYSLIPIDEMIA, GOAL LDL BELOW 100: ICD-10-CM

## 2025-06-04 RX ORDER — ROSUVASTATIN CALCIUM 10 MG/1
10 TABLET, COATED ORAL
Qty: 90 TABLET | Refills: 1 | Status: SHIPPED | OUTPATIENT
Start: 2025-06-04

## 2025-06-05 DIAGNOSIS — I10 PRIMARY HYPERTENSION: ICD-10-CM

## 2025-06-05 RX ORDER — LOSARTAN POTASSIUM AND HYDROCHLOROTHIAZIDE 25; 100 MG/1; MG/1
1 TABLET ORAL DAILY
Qty: 90 TABLET | Refills: 3 | Status: SHIPPED | OUTPATIENT
Start: 2025-06-05

## 2025-06-23 ENCOUNTER — OFFICE VISIT (OUTPATIENT)
Dept: CARDIOLOGY | Facility: HOSPITAL | Age: 50
End: 2025-06-23
Payer: COMMERCIAL

## 2025-06-23 VITALS
OXYGEN SATURATION: 98 % | SYSTOLIC BLOOD PRESSURE: 138 MMHG | HEIGHT: 72 IN | HEART RATE: 67 BPM | BODY MASS INDEX: 33.05 KG/M2 | DIASTOLIC BLOOD PRESSURE: 90 MMHG | WEIGHT: 244 LBS

## 2025-06-23 DIAGNOSIS — I47.29 NSVT (NONSUSTAINED VENTRICULAR TACHYCARDIA) (MULTI): Primary | ICD-10-CM

## 2025-06-23 LAB
ATRIAL RATE: 76 BPM
P AXIS: 38 DEGREES
P OFFSET: 200 MS
P ONSET: 146 MS
PR INTERVAL: 156 MS
Q ONSET: 224 MS
QRS COUNT: 12 BEATS
QRS DURATION: 96 MS
QT INTERVAL: 368 MS
QTC CALCULATION(BAZETT): 414 MS
QTC FREDERICIA: 398 MS
R AXIS: 18 DEGREES
T AXIS: 15 DEGREES
T OFFSET: 408 MS
VENTRICULAR RATE: 76 BPM

## 2025-06-23 PROCEDURE — 3008F BODY MASS INDEX DOCD: CPT | Performed by: INTERNAL MEDICINE

## 2025-06-23 PROCEDURE — 99214 OFFICE O/P EST MOD 30 MIN: CPT | Performed by: INTERNAL MEDICINE

## 2025-06-23 PROCEDURE — 93010 ELECTROCARDIOGRAM REPORT: CPT | Performed by: INTERNAL MEDICINE

## 2025-06-23 PROCEDURE — 3075F SYST BP GE 130 - 139MM HG: CPT | Performed by: INTERNAL MEDICINE

## 2025-06-23 PROCEDURE — 1036F TOBACCO NON-USER: CPT | Performed by: INTERNAL MEDICINE

## 2025-06-23 PROCEDURE — 93005 ELECTROCARDIOGRAM TRACING: CPT | Performed by: INTERNAL MEDICINE

## 2025-06-23 PROCEDURE — 3080F DIAST BP >= 90 MM HG: CPT | Performed by: INTERNAL MEDICINE

## 2025-06-23 NOTE — PROGRESS NOTES
Subjective:  The patient is a 50-year-old gentleman who presents for follow-up of frequent ventricular ectopy.  He was seen by me in the office in February 2025, with details in that note.  He has known hypertension, obstructive sleep apnea (status post uvuloplasty), hyperlipidemia, GERD, and mild obesity.    The patient has had mildly symptomatic PVCs in the past, with up to 4 beats of nonsustained VT.  A workup in 2021 showed normal left ventricular function but some late gadolinium enhancement in the basal septal area by cardiac MR.  Dr. Angulo saw the patient and spoke with him about possible PVC ablation, but the PVC burden at that time was felt to be low and there were multiple PVC morphologies.  The patient was treated with diltiazem for what was thought to be triggered PVCs.  By event monitoring in late 2024, his PVC burden was 6.7%.  He underwent cardiac catheterization in February 2025, showing minimal coronary disease, and subsequently was switched from diltiazem to beta-blockers.  An event monitor in April 2025 showed a 12.4% PVC burden with up to 5 beats of nonsustained VT.  The dominant PVC morphology appeared to be upright in the inferior leads but with smaller QRS complexes than the conducted sinus beats.  The patient is only mildly symptomatic with palpitations, particularly when he is sitting or lying at rest.  He denies any syncopal episodes.    The patient lives in the [facility], but works as a  in [place].  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    Current Outpatient Medications   Medication Sig    famotidine (Pepcid) 10 mg tablet Take 1 tablet (10 mg) by mouth once daily at bedtime.    losartan-hydrochlorothiazide (Hyzaar) 100-25 mg tablet TAKE 1 TABLET BY MOUTH EVERY DAY    metoprolol succinate XL (Toprol-XL) 50 mg 24 hr tablet Take 1  tablet (50 mg) by mouth once daily. Do not crush or chew.    multivitamin tablet Multi Vitamin Daily Oral Tablet    rosuvastatin (Crestor) 10 mg tablet TAKE 1 TABLET BY MOUTH EVERY DAY WITH DINNER     Allergies:  Patient has no known allergies.     Patient Active Problem List  Diagnosis    Dyslipidemia, goal LDL below 100    Gastroesophageal reflux disease without esophagitis    Hypovitaminosis D    Metabolic syndrome X    NSVT (nonsustained ventricular tachycardia) (Multi)    LESLY on CPAP    PVCs (premature ventricular contractions)    Personal history of colonic polyps    Hemorrhoids    Diverticulosis    Class 1 obesity due to excess calories with serious comorbidity and body mass index (BMI) of 33.0 to 33.9 in adult    Plantar fasciitis, left    Primary hypertension    ACE-inhibitor cough    Flu vaccine need     Past Surgical History:  Procedure Laterality Date    CARDIAC CATHETERIZATION N/A 2/24/2025    Procedure: Left Heart Cath, With LV;  Surgeon: Armin Perry MD;  Location: [facility];  Service: Cardiovascular;  Laterality: N/A;    OTHER SURGICAL HISTORY  07/29/2021    Hemorrhoidectomy     Objective:  Vitals:    06/23/25 0849   BP: 138/90   Pulse: 67   SpO2: 98%   Height:     1.829 m (6')  Weight: 111 kg (244 lb)     Exam:  Gen: Stocky middle-age gentleman in no distress.  HEENT: Remarkable for shaved head.  Neck: No jugular venous distention or thyromegaly.  Lungs: Clear to auscultation, with no wheezes or rales.  Heart: Regular rhythm without extrasystoles today; no murmurs or gallops.  Abdomen: Benign, with no organomegaly or masses.  Extremities: Intact distal pulses; no edema.  Neuro: No focal neurologic abnormalities.  Skin: No cutaneous lesions.    EKG: An EKG today shows sinus rhythm at 76 bpm without any ectopy and is within normal limits    Impressions:  1.  Chronic hypertension, borderline control.  2.  Obesity with obstructive sleep apnea, status post uvuloplasty roughly 6 months ago.  3.   Frequent ventricular ectopy with 12.4% PVC burden by recent event monitor.  This may be referable to his nonischemic basal septal scarring, which could have been due to subclinical myocarditis in the past.  Given this level of PVCs, the patient is at some risk of developing a PVC-induced cardiomyopathy, so more aggressive therapy is felt to be warranted.  His risk of sudden death should still be quite low, in my opinion.  4.  Other medical problems, including hyperlipidemia and gastroesophageal reflux disease    Recommendations:  1.  Given the increased PVC frequency compared to prior studies, I feel that the patient is now a reasonable candidate for an attempt at PVC ablation.  2.  He will be referred to Dr. Mani Bunch for consideration of this procedure, even though we do not have a good 12-lead EKG to confirm the dominant PVC morphology.  3.  If Dr. Bunch elects against performing the procedure, the patient would be a candidate for treatment with mexiletine or perhaps even flecainide.  4.  With any future unexplained near-syncope or syncope, an insertable loop recorder would be a reasonable option to assure that the patient is not having more sustained ventricular arrhythmias.  5.  At present, I do not see any strong indications for EP testing looking for sustained VT that would represent an indication for ICD placement.      Ephraim Stewart MD

## 2025-06-27 ENCOUNTER — TELEPHONE (OUTPATIENT)
Dept: PRIMARY CARE | Facility: CLINIC | Age: 50
End: 2025-06-27
Payer: COMMERCIAL

## 2025-06-27 DIAGNOSIS — E55.9 HYPOVITAMINOSIS D: ICD-10-CM

## 2025-06-27 DIAGNOSIS — E78.5 DYSLIPIDEMIA, GOAL LDL BELOW 100: Primary | ICD-10-CM

## 2025-06-27 DIAGNOSIS — Z12.5 PROSTATE CANCER SCREENING: ICD-10-CM

## 2025-06-27 DIAGNOSIS — R73.02 IGT (IMPAIRED GLUCOSE TOLERANCE): ICD-10-CM

## 2025-06-27 DIAGNOSIS — I10 PRIMARY HYPERTENSION: ICD-10-CM

## 2025-07-01 LAB
25(OH)D3+25(OH)D2 SERPL-MCNC: 60 NG/ML (ref 30–100)
ALBUMIN SERPL-MCNC: 4.4 G/DL (ref 3.6–5.1)
ALBUMIN/CREAT UR: 2 MG/G CREAT
ALP SERPL-CCNC: 46 U/L (ref 35–144)
ALT SERPL-CCNC: 36 U/L (ref 9–46)
ANION GAP SERPL CALCULATED.4IONS-SCNC: 10 MMOL/L (CALC) (ref 7–17)
AST SERPL-CCNC: 22 U/L (ref 10–35)
BILIRUB SERPL-MCNC: 0.9 MG/DL (ref 0.2–1.2)
BUN SERPL-MCNC: 25 MG/DL (ref 7–25)
CALCIUM SERPL-MCNC: 9.5 MG/DL (ref 8.6–10.3)
CHLORIDE SERPL-SCNC: 101 MMOL/L (ref 98–110)
CHOLEST SERPL-MCNC: 161 MG/DL
CHOLEST/HDLC SERPL: 5.4 (CALC)
CO2 SERPL-SCNC: 27 MMOL/L (ref 20–32)
CREAT SERPL-MCNC: 1.31 MG/DL (ref 0.7–1.3)
CREAT UR-MCNC: 191 MG/DL (ref 20–320)
EGFRCR SERPLBLD CKD-EPI 2021: 65 ML/MIN/1.73M2
EST. AVERAGE GLUCOSE BLD GHB EST-MCNC: 108 MG/DL
EST. AVERAGE GLUCOSE BLD GHB EST-SCNC: 6 MMOL/L
GLUCOSE SERPL-MCNC: 94 MG/DL (ref 65–99)
HBA1C MFR BLD: 5.4 %
HDLC SERPL-MCNC: 30 MG/DL
LDLC SERPL CALC-MCNC: 90 MG/DL (CALC)
MICROALBUMIN UR-MCNC: 0.3 MG/DL
NONHDLC SERPL-MCNC: 131 MG/DL (CALC)
POTASSIUM SERPL-SCNC: 3.7 MMOL/L (ref 3.5–5.3)
PROT SERPL-MCNC: 7.2 G/DL (ref 6.1–8.1)
PSA SERPL-MCNC: 0.8 NG/ML
SODIUM SERPL-SCNC: 138 MMOL/L (ref 135–146)
TRIGL SERPL-MCNC: 310 MG/DL

## 2025-07-02 ENCOUNTER — APPOINTMENT (OUTPATIENT)
Dept: PRIMARY CARE | Facility: CLINIC | Age: 50
End: 2025-07-02
Payer: COMMERCIAL

## 2025-07-02 VITALS
HEIGHT: 72 IN | WEIGHT: 247.6 LBS | DIASTOLIC BLOOD PRESSURE: 81 MMHG | SYSTOLIC BLOOD PRESSURE: 126 MMHG | HEART RATE: 82 BPM | OXYGEN SATURATION: 95 % | BODY MASS INDEX: 33.54 KG/M2

## 2025-07-02 DIAGNOSIS — E66.811 CLASS 1 OBESITY DUE TO EXCESS CALORIES WITH SERIOUS COMORBIDITY AND BODY MASS INDEX (BMI) OF 33.0 TO 33.9 IN ADULT: ICD-10-CM

## 2025-07-02 DIAGNOSIS — E78.5 DYSLIPIDEMIA, GOAL LDL BELOW 100: ICD-10-CM

## 2025-07-02 DIAGNOSIS — I47.29 NSVT (NONSUSTAINED VENTRICULAR TACHYCARDIA) (MULTI): ICD-10-CM

## 2025-07-02 DIAGNOSIS — D12.6 TUBULAR ADENOMA OF COLON: ICD-10-CM

## 2025-07-02 DIAGNOSIS — R73.02 IGT (IMPAIRED GLUCOSE TOLERANCE): ICD-10-CM

## 2025-07-02 DIAGNOSIS — G47.33 OBSTRUCTIVE SLEEP APNEA SYNDROME: ICD-10-CM

## 2025-07-02 DIAGNOSIS — I49.3 PVCS (PREMATURE VENTRICULAR CONTRACTIONS): Primary | ICD-10-CM

## 2025-07-02 DIAGNOSIS — E66.09 CLASS 1 OBESITY DUE TO EXCESS CALORIES WITH SERIOUS COMORBIDITY AND BODY MASS INDEX (BMI) OF 33.0 TO 33.9 IN ADULT: ICD-10-CM

## 2025-07-02 DIAGNOSIS — I10 PRIMARY HYPERTENSION: ICD-10-CM

## 2025-07-02 PROCEDURE — 3008F BODY MASS INDEX DOCD: CPT | Performed by: INTERNAL MEDICINE

## 2025-07-02 PROCEDURE — 1036F TOBACCO NON-USER: CPT | Performed by: INTERNAL MEDICINE

## 2025-07-02 PROCEDURE — 3079F DIAST BP 80-89 MM HG: CPT | Performed by: INTERNAL MEDICINE

## 2025-07-02 PROCEDURE — 3074F SYST BP LT 130 MM HG: CPT | Performed by: INTERNAL MEDICINE

## 2025-07-02 PROCEDURE — 99214 OFFICE O/P EST MOD 30 MIN: CPT | Performed by: INTERNAL MEDICINE

## 2025-07-02 RX ORDER — METOPROLOL SUCCINATE 100 MG/1
100 TABLET, EXTENDED RELEASE ORAL DAILY
Qty: 90 TABLET | Refills: 3 | Status: SHIPPED | OUTPATIENT
Start: 2025-07-02 | End: 2026-07-02

## 2025-07-02 ASSESSMENT — PATIENT HEALTH QUESTIONNAIRE - PHQ9
1. LITTLE INTEREST OR PLEASURE IN DOING THINGS: NOT AT ALL
2. FEELING DOWN, DEPRESSED OR HOPELESS: NOT AT ALL
SUM OF ALL RESPONSES TO PHQ9 QUESTIONS 1 AND 2: 0

## 2025-07-02 NOTE — ASSESSMENT & PLAN NOTE
The patient received Current weight: 112 kg (247 lb 9.6 oz)  Weight change since last visit (-) denotes wt loss 3.6 lbs   Weight loss needed to achieve BMI 25: 63.7 Lbs  Weight loss needed to achieve BMI 30: 26.9 Lbs    Provided instructions on dietary changes  Provided instructions on exercise  Advised to Increase physical activity because they have an above normal BMI.

## 2025-07-02 NOTE — ASSESSMENT & PLAN NOTE
Fasting blood sugar stable with hemoglobin A1c 5.6 continue to improve in the setting of high triglycerides  Orders:    Comprehensive Metabolic Panel; Future    Hemoglobin A1C; Future    Lipid Panel; Future    Albumin-Creatinine Ratio, Urine Random; Future

## 2025-07-02 NOTE — ASSESSMENT & PLAN NOTE
Blood pressure controlled well on losartan hydrochlorothiazide 100/25 1 tablet daily  Orders:    Follow Up In [facility] - PCP - Established    metoprolol succinate XL (Toprol-XL) 100 mg 24 hr tablet; Take 1 tablet (100 mg) by mouth once daily. Do not crush or chew.    Comprehensive Metabolic Panel; Future    Hemoglobin A1C; Future    Lipid Panel; Future    Albumin-Creatinine Ratio, Urine Random; Future

## 2025-07-02 NOTE — ASSESSMENT & PLAN NOTE
Patient with surgical removal of soft tissues of uvuloplasty with effective management and control at this time

## 2025-07-02 NOTE — ASSESSMENT & PLAN NOTE
Increase metoprolol from 50 to 100 mg daily follow-up with the EP specialist to evaluate for ablation or ongoing symptoms  Orders:    metoprolol succinate XL (Toprol-XL) 100 mg 24 hr tablet; Take 1 tablet (100 mg) by mouth once daily. Do not crush or chew.

## 2025-07-02 NOTE — ASSESSMENT & PLAN NOTE
Optimal LDL cholesterol and reduction in risk of rosuvastatin 10 mg daily work on elevated triglycerides and improve diet  Orders:    Comprehensive Metabolic Panel; Future    Hemoglobin A1C; Future    Lipid Panel; Future    Albumin-Creatinine Ratio, Urine Random; Future

## 2025-07-02 NOTE — ASSESSMENT & PLAN NOTE
Evaluate with EPS physician for ablation procedure regarding symptomatic PVCs increase metoprolol to dampen symptoms as tolerated

## 2025-07-02 NOTE — PROGRESS NOTES
Subjective   Patient ID: Bubba Garza is a 50 y.o. male who presents for Follow-up.    HPI     Review of Systems    Objective   /81   Pulse 82   Ht 1.829 m (6')   Wt 112 kg (247 lb 9.6 oz)   SpO2 95%   BMI 33.58 kg/m²     Physical Exam    Assessment/Plan

## 2025-07-02 NOTE — PROGRESS NOTES
Subjective   Reason for Visit: Bubba aGrza is an 50 y.o. male here for a Medicare Wellness visit.     Past Medical, Surgical, and Family History reviewed and updated in chart.    Reviewed all medications by prescribing practitioner or clinical pharmacist (such as prescriptions, OTCs, herbal therapies and supplements) and documented in the medical record.    HPI    Patient Care Team:  Andrey Antonio DO as PCP - General (Internal Medicine)  Andrey Antonio DO as PCP - [facility] PCP     Review of Systems   All other systems reviewed and are negative.      Objective   Vitals:  /81   Pulse 82   Ht 1.829 m (6')   Wt 112 kg (247 lb 9.6 oz)   SpO2 95%   BMI 33.58 kg/m²       Physical Exam  Vitals and nursing note reviewed.   Constitutional:       General: He is not in acute distress.     Appearance: Normal appearance. He is well-developed. He is obese. He is not toxic-appearing.   HENT:      Head: Normocephalic and atraumatic.      Right Ear: Tympanic membrane and external ear normal.      Left Ear: Tympanic membrane and external ear normal.      Nose: Nose normal.      Mouth/Throat:      Mouth: Mucous membranes are moist.      Pharynx: Oropharynx is clear. No oropharyngeal exudate or posterior oropharyngeal erythema.      Tonsils: No tonsillar exudate. 2+ on the right. 2+ on the left.   Eyes:      Extraocular Movements: Extraocular movements intact.      Conjunctiva/sclera: Conjunctivae normal.   Cardiovascular:      Rate and Rhythm: Normal rate and regular rhythm.      Pulses: Normal pulses.      Heart sounds: Normal heart sounds. No murmur heard.  Pulmonary:      Effort: Pulmonary effort is normal.      Breath sounds: Normal breath sounds.   Abdominal:      General: Abdomen is flat. Bowel sounds are normal.      Palpations: Abdomen is soft.   Musculoskeletal:      Cervical back: Neck supple.   Feet:      Right foot:      Skin integrity: Skin integrity normal. No ulcer, blister, skin breakdown,  erythema, warmth or callus.      Toenail Condition: Right toenails are normal.      Left foot:      Skin integrity: Skin integrity normal. No ulcer, blister, skin breakdown, erythema, warmth or callus.      Toenail Condition: Left toenails are normal.   Lymphadenopathy:      Cervical: No cervical adenopathy.   Skin:     General: Skin is warm and dry.      Capillary Refill: Capillary refill takes more than 3 seconds.      Findings: No rash.   Neurological:      Mental Status: He is alert. Mental status is at baseline.      Sensory: Sensation is intact.   Psychiatric:         Mood and Affect: Mood normal.         Behavior: Behavior normal.         Thought Content: Thought content normal.         Judgment: Judgment normal.         Assessment & Plan  Primary hypertension  Blood pressure controlled well on losartan hydrochlorothiazide 100/25 1 tablet daily  Orders:    Follow Up In [facility] - PCP - Established    metoprolol succinate XL (Toprol-XL) 100 mg 24 hr tablet; Take 1 tablet (100 mg) by mouth once daily. Do not crush or chew.    Comprehensive Metabolic Panel; Future    Hemoglobin A1C; Future    Lipid Panel; Future    Albumin-Creatinine Ratio, Urine Random; Future    PVCs (premature ventricular contractions)  Increase metoprolol from 50 to 100 mg daily follow-up with the EP specialist to evaluate for ablation or ongoing symptoms  Orders:    metoprolol succinate XL (Toprol-XL) 100 mg 24 hr tablet; Take 1 tablet (100 mg) by mouth once daily. Do not crush or chew.    NSVT (nonsustained ventricular tachycardia) (Multi)  Evaluate with EPS physician for ablation procedure regarding symptomatic PVCs increase metoprolol to dampen symptoms as tolerated       Class 1 obesity due to excess calories with serious comorbidity and body mass index (BMI) of 33.0 to 33.9 in adult  The patient received Current weight: 112 kg (247 lb 9.6 oz)  Weight change since last visit (-) denotes wt loss 3.6 lbs   Weight loss needed  to achieve BMI 25: 63.7 Lbs  Weight loss needed to achieve BMI 30: 26.9 Lbs    Provided instructions on dietary changes  Provided instructions on exercise  Advised to Increase physical activity because they have an above normal BMI.        Tubular adenoma of colon  Follow-up with referral to gastroenterology later this year for surveillance colonoscopy regarding tubular adenoma with early dysplasia 3-year follow-up  Orders:    Referral to Gastroenterology; Future    Dyslipidemia, goal LDL below 100  Optimal LDL cholesterol and reduction in risk of rosuvastatin 10 mg daily work on elevated triglycerides and improve diet  Orders:    Comprehensive Metabolic Panel; Future    Hemoglobin A1C; Future    Lipid Panel; Future    Albumin-Creatinine Ratio, Urine Random; Future    IGT (impaired glucose tolerance)  Fasting blood sugar stable with hemoglobin A1c 5.6 continue to improve in the setting of high triglycerides  Orders:    Comprehensive Metabolic Panel; Future    Hemoglobin A1C; Future    Lipid Panel; Future    Albumin-Creatinine Ratio, Urine Random; Future    Obstructive sleep apnea syndrome  Patient with surgical removal of soft tissues of uvuloplasty with effective management and control at this time

## 2025-07-02 NOTE — ASSESSMENT & PLAN NOTE
Follow-up with referral to gastroenterology later this year for surveillance colonoscopy regarding tubular adenoma with early dysplasia 3-year follow-up  Orders:    Referral to Gastroenterology; Future

## 2025-08-01 ENCOUNTER — OFFICE VISIT (OUTPATIENT)
Dept: CARDIOLOGY | Facility: HOSPITAL | Age: 50
End: 2025-08-01
Payer: COMMERCIAL

## 2025-08-01 VITALS
HEART RATE: 65 BPM | BODY MASS INDEX: 33.48 KG/M2 | WEIGHT: 247.2 LBS | HEIGHT: 72 IN | SYSTOLIC BLOOD PRESSURE: 136 MMHG | OXYGEN SATURATION: 99 % | DIASTOLIC BLOOD PRESSURE: 76 MMHG

## 2025-08-01 DIAGNOSIS — I47.29 NSVT (NONSUSTAINED VENTRICULAR TACHYCARDIA) (MULTI): Primary | ICD-10-CM

## 2025-08-01 PROCEDURE — 93005 ELECTROCARDIOGRAM TRACING: CPT | Performed by: INTERNAL MEDICINE

## 2025-08-01 PROCEDURE — 1036F TOBACCO NON-USER: CPT | Performed by: INTERNAL MEDICINE

## 2025-08-01 PROCEDURE — 3075F SYST BP GE 130 - 139MM HG: CPT | Performed by: INTERNAL MEDICINE

## 2025-08-01 PROCEDURE — 99202 OFFICE O/P NEW SF 15 MIN: CPT | Mod: 25

## 2025-08-01 PROCEDURE — 3078F DIAST BP <80 MM HG: CPT | Performed by: INTERNAL MEDICINE

## 2025-08-01 PROCEDURE — 99204 OFFICE O/P NEW MOD 45 MIN: CPT | Performed by: INTERNAL MEDICINE

## 2025-08-01 PROCEDURE — 3008F BODY MASS INDEX DOCD: CPT | Performed by: INTERNAL MEDICINE

## 2025-08-01 ASSESSMENT — PATIENT HEALTH QUESTIONNAIRE - PHQ9
2. FEELING DOWN, DEPRESSED OR HOPELESS: NOT AT ALL
1. LITTLE INTEREST OR PLEASURE IN DOING THINGS: NOT AT ALL
SUM OF ALL RESPONSES TO PHQ9 QUESTIONS 1 AND 2: 0

## 2025-08-01 ASSESSMENT — COLUMBIA-SUICIDE SEVERITY RATING SCALE - C-SSRS
6. HAVE YOU EVER DONE ANYTHING, STARTED TO DO ANYTHING, OR PREPARED TO DO ANYTHING TO END YOUR LIFE?: NO
2. HAVE YOU ACTUALLY HAD ANY THOUGHTS OF KILLING YOURSELF?: NO
1. IN THE PAST MONTH, HAVE YOU WISHED YOU WERE DEAD OR WISHED YOU COULD GO TO SLEEP AND NOT WAKE UP?: NO

## 2025-08-01 ASSESSMENT — PAIN SCALES - GENERAL: PAINLEVEL_OUTOF10: 0-NO PAIN

## 2025-08-01 ASSESSMENT — ENCOUNTER SYMPTOMS
OCCASIONAL FEELINGS OF UNSTEADINESS: 0
DEPRESSION: 0
LOSS OF SENSATION IN FEET: 0

## 2025-08-01 NOTE — PROGRESS NOTES
Referring Provider: Ephraim Stewart MD  Reason for Consult: Frequent PVCs    History of Present Illness:      Bubba Garza is a 50 y.o. year old male patient with a history significant for hypertension, LESLY, hyperlipidemia, GERD, and frequent PVCs who is referred by Dr. Stewart for the duration of catheter ablation for his PVCs.    Patient was initially found to have PVCs in 2021.  His main symptom was palpitations at that time.  He had an MRI which showed some late gadolinium enhancement in the basal septal area by cardiac MRI.  He was seen by Dr. Angulo at that time, PVC ablation was discussed, but PVC burden at that time was felt to be low and there were multiple PVC morphologies.  He was treated with diltiazem for what was thought to be triggered PVCs.  In 2024, a repeat monitor showed a PVC burden of 6.7%.  He underwent cardiac catheterization in February 2025, which showed minimal coronary disease.  He was started on beta-blockers at that time.  A repeat event monitor was done in April 2025 which showed a 12.4% PVC burden with 1 predominant morphology.  He also had 1 short run of nonsustained VT.  He is referred here for consideration of catheter ablation.    He continues to be minimally symptomatic.  He notices some occasional palpitations as well as mild fatigue.  But otherwise no significant issues.    Focused Cardiovascular Problem List:  Frequent PVCs  Minimal CAD  Hypertension  Obstructive sleep apnea      Past Medical and Surgical History:  Mr. Garza  has a past medical history of Heartburn (11/17/2021), Hypertension, Obesity, unspecified (08/31/2021), LESLY (obstructive sleep apnea), Other obesity due to excess calories (02/01/2022), Personal history of other specified conditions, and Personal history of other specified conditions (08/31/2021).    has a past surgical history that includes Other surgical history (07/29/2021) and Cardiac catheterization (N/A, 2/24/2025).    Social  "History:  Social History     Tobacco Use    Smoking status: Never    Smokeless tobacco: Never   Substance Use Topics    Alcohol use: Yes     Alcohol/week: 2.0 standard drinks of alcohol     Types: 2 Standard drinks or equivalent per week     Comment: Occasionally, social      Tobacco: Denies  Alcohol: Social  Drug use:  Denies  Occupational History:   Other: Lives at home with wife    Relevant Family History:   Family History[1]  No relevant family history     Allergies:  RX Allergies[2]     Medications:  Current Outpatient Medications   Medication Instructions    COQ10, UBIQUINOL, ORAL 100 mg, Daily    famotidine (PEPCID) 10 mg, oral, Nightly    losartan-hydrochlorothiazide (Hyzaar) 100-25 mg tablet 1 tablet, oral, Daily    metoprolol succinate XL (TOPROL-XL) 100 mg, oral, Daily, Do not crush or chew.    multivitamin tablet Multi Vitamin Daily Oral Tablet   Refills: 0       Active    rosuvastatin (CRESTOR) 10 mg, oral, Daily with evening meal         Objective   Physical Exam:  Last Recorded Vitals:      2/20/2025    10:10 AM 2/24/2025     6:57 AM 2/28/2025    12:56 PM 5/29/2025    10:44 AM 6/23/2025     8:49 AM 7/2/2025    10:55 AM 8/1/2025    11:06 AM   Vitals   Systolic 137 140 138 120 138 126 136   Diastolic 86 76 70 80 90 81 76   BP Location Left arm  Right arm Left arm Left arm  Left arm   Heart Rate 87 94 91 73 67 82 65   Temp  36.6 °C (97.8 °F)        Resp  17        Height 1.88 m (6' 2\") 1.829 m (6') 1.829 m (6') 1.829 m (6') 1.829 m (6') 1.829 m (6') 1.829 m (6')   Weight (lb) 240 240 242 244.8 244 247.6 247.2   BMI 30.81 kg/m2 32.55 kg/m2 32.82 kg/m2 33.2 kg/m2 33.09 kg/m2 33.58 kg/m2 33.53 kg/m2   BSA (m2) 2.39 m2 2.35 m2 2.36 m2 2.37 m2 2.37 m2 2.39 m2 2.39 m2   Visit Report Report  Report Report Report Report Report    Visit Vitals  /76 (BP Location: Left arm, Patient Position: Sitting, BP Cuff Size: Large adult)   Pulse 65   Ht 1.829 m (6')   Wt 112 kg (247 lb 3.2 oz)   SpO2 99% "   BMI 33.53 kg/m²   Smoking Status Never   BSA 2.39 m²      Gen: NAD, sitting comfortably  HEENT: NC/AT  Card: RRR, no m/r/g  Pulm: Clear to auscultation bilaterally  Ext: No LE edema  Neuro: No focal deficits    Diagnostic Results      My Independent nterpretation of Reviewed Study(s):  Prior ECGs (reviewed and my interpretation):   2025: Sinus rhythm with PVCs, heart rate 72 bpm    Cardiac Rhythm Monitors:  2025: 14-day monitor showing frequent PVCs with the overall burden of 12.4%.  Very short runs of nonsustained VT lasting up to 5 beats.  PVCs were symptomatic.  1 predominant morphology of PVCs with 11.1% burden.      Echocardiography:  2025: LVEF normal, left atrial size normal, right atrial size normal, no significant valvular disease       Other Relevant Imagin2021: Normal LV size with normal systolic function, late gadolinium enhancement at the right ventricular insertion point in the basal septal mid myocardium, no evidence of prior infarction      Relevant Labs:  Lab Results   Component Value Date    CREATININE 1.31 (H) 2025    CREATININE 1.27 2025    CREATININE 1.04 10/28/2024    K 3.7 2025    K 3.6 2025    K 3.8 10/28/2024    HGBA1C 5.4 2025    HGBA1C 5.5 2023    HGBA1C 5.2 2022    HGB 14.8 2025    HGB 15.2 2024    HGB 15.2 2021    AST 22 2025    AST 20 10/28/2024    AST 18 2024    ALT 36 2025    ALT 33 10/28/2024    ALT 27 2024       Assessment/Plan   Assessment and Plan:  Bubba Garza is a 50 y.o. year old male patient who is referred for management and evaluation of frequent PVCs.  Is unclear where the PVCs are arising from because we have not caught a full twelve-lead ECG, but they at least appear to be outflow tract in origin.  He has not had decrement in his ejection fraction, but he is symptomatic with the PVCs.  Given his symptoms, I would recommend some conservative treatment for his  PVCs.    We discussed treatment options in detail.  This includes antiarrhythmic drug therapy with flecainide versus catheter ablation.  Both of these are reasonable options and depend on patient preference.  We discussed the risks and benefits of catheter ablation in detail.  We also discussed the risks and benefits of medical management with antiarrhythmic drug therapy.    The patient is not sure yet what he wants to pursue.  If he does want to pursue catheter ablation, then I will schedule him as soon as possible.  If he wants to trial a medication, then we can start a low-dose of flecainide 50 mg twice daily and I will have him continue to follow-up with Dr. Stewart, until he decides to move forward with catheter ablation.    # Frequent outflow tract PVCs, symptomatic  - Catheter ablation versus flecainide therapy risks and benefits discussed in detail  - Patient will think about at home and contact us with his final decision  - In the meantime continue metoprolol  mg daily             Return to Clinic: As needed    Thank you very much for allowing me to participate in the care of this patient. Please do not hesitate to contact me with any further questions or concerns.    Mani Bunch MD  Clinical Cardiac Electrophysiologist, [facility] [place]    of Medicine, [employer]  Director of Atrial Fibrillation Ablation, [facility]  Director of Ventricular Arrhythmias Research, [facility]  Office Phone Number: [phone]              [1]   Family History  Problem Relation Name Age of Onset    Diabetes Father      Sleep apnea Brother      Other (S/P CABG (CORONARY ARTERY BYPASS GRAFT)) Maternal Grandmother      Heart attack Maternal Grandfather      Diabetes type I Father Bubba Sr     Diabetes type I Paternal Grandmother Yareli Greg    [2] No Known Allergies

## 2025-09-12 ENCOUNTER — APPOINTMENT (OUTPATIENT)
Facility: CLINIC | Age: 50
End: 2025-09-12
Payer: COMMERCIAL

## 2026-01-05 ENCOUNTER — APPOINTMENT (OUTPATIENT)
Dept: PRIMARY CARE | Facility: CLINIC | Age: 51
End: 2026-01-05
Payer: COMMERCIAL

## (undated) DEVICE — GUIDEWIRE, INQUIRE, J TIP, .035 X 210CM, FIXED CORE, DIAGNOSTIC

## (undated) DEVICE — TR BAND, RADIAL COMPRESSION, STANDARD, 24CM

## (undated) DEVICE — CATHETER, DIAGNOSTIC, DXTERITY, 6FR JR 4.0, 100CM

## (undated) DEVICE — SHEATH, GLIDESHEATH, SLENDER, 6FR 10CM

## (undated) DEVICE — CATHETER, OPTITORQUE, 6FR, JACKY, BL3.5/2H/100CM

## (undated) DEVICE — GUIDEWIRE, INQWIRE, 3MM J, .035 X 210CM, FIXED